# Patient Record
Sex: FEMALE | Race: WHITE | NOT HISPANIC OR LATINO | Employment: UNEMPLOYED | URBAN - NONMETROPOLITAN AREA
[De-identification: names, ages, dates, MRNs, and addresses within clinical notes are randomized per-mention and may not be internally consistent; named-entity substitution may affect disease eponyms.]

---
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
## 2019-01-02 ENCOUNTER — OFFICE VISIT (OUTPATIENT)
Dept: ORTHOPEDIC SURGERY | Facility: CLINIC | Age: 19
End: 2019-01-02

## 2019-01-02 VITALS — WEIGHT: 130 LBS | RESPIRATION RATE: 18 BRPM | HEIGHT: 60 IN | BODY MASS INDEX: 25.52 KG/M2

## 2019-01-02 DIAGNOSIS — M25.571 ARTHRALGIA OF RIGHT FOOT: Primary | ICD-10-CM

## 2019-01-02 DIAGNOSIS — T84.84XA PAINFUL ORTHOPAEDIC HARDWARE (HCC): ICD-10-CM

## 2019-01-02 DIAGNOSIS — Z98.890 STATUS POST HARDWARE REMOVAL: ICD-10-CM

## 2019-01-02 PROCEDURE — 99024 POSTOP FOLLOW-UP VISIT: CPT | Performed by: PHYSICIAN ASSISTANT

## 2019-01-02 NOTE — PROGRESS NOTES
Subjective   Patient ID: Beatriz Rai is a 18 y.o.  female is here today for a post-operative visit.  Post-op of the Right Ankle (Elective open removal of hardware from right ankle distal fibula 12/14/18)          CHIEF COMPLAINT:        History of Present Illness      Pain controlled: [] no   [x] yes   Medication refill requested: [x] no   [] yes    Patient compliant with instructions: [] no   [x] yes   Other: Reports good progress since surgery.  Patient states she has no pain to her ankle.  However, she states after walking for long periods of time she will have soreness to the top of her right foot.  This does subside with rest and ice.  She denies numbness or tingling   Patient thinks that due to the recent hardware removal of the ankle she may be overcompensating with the right foot.       Past Medical History:   Diagnosis Date   • Anxiety    • Bacterial vaginosis     REPORTS DIAGNOSED 12-11-18 AND THAT SHE WILL TAKE FLAGYL PER MD INSTRUCTIONS   • Body piercing     NOSE, EARS, BELLY BUTTON   • Closed right ankle fracture 2014    REPORTS INVOLVEMENT OF GROWTH PLATE   • Closed right arm fracture 2006   • Headache    • History of pneumonia    • Tachycardia     REPORTS SHE IS UNDER CARE OF DR GÓMEZ WITH MEDICATION    • Tattoos         Past Surgical History:   Procedure Laterality Date   • ANKLE OPEN REDUCTION INTERNAL FIXATION Right 09/25/2014    MD Jose Rafael   • APPENDECTOMY     • Elective open removal of hardware from right ankle distal fibula Right 12/14/2018    Performed by Stevo Clark MD at [facility] OR       No Known Allergies    Review of Systems   Constitutional: Negative for fever.   HENT: Negative for voice change.    Eyes: Negative for visual disturbance.   Respiratory: Negative for shortness of breath.    Cardiovascular: Negative for chest pain.   Gastrointestinal: Negative for abdominal distention and abdominal pain.   Genitourinary: Negative for dysuria.   Musculoskeletal: Positive for  "arthralgias. Negative for gait problem and joint swelling.   Skin: Negative for rash.   Neurological: Negative for speech difficulty.   Hematological: Does not bruise/bleed easily.   Psychiatric/Behavioral: Negative for confusion.       Objective   Resp 18   Ht 152.4 cm (60\")   Wt 59 kg (130 lb)   LMP 12/08/2018 (Within Days)   BMI 25.39 kg/m²       Signs of infection: [x] no                    [] yes   Drainage: [x] no                    [] yes   Incision: [x] healing well     []healed well   Motor exam intact: [] no                    [x] yes   Neurovascular exam intact: [] no                    [x] yes   Signs of compartment syndrome: [x] no                    [] yes   Signs of DVT: [x] no                    [] yes   Other:      Physical Exam   Constitutional: She is oriented to person, place, and time. She appears well-nourished.   Pulmonary/Chest: Effort normal.   Musculoskeletal:        Right ankle: She exhibits no swelling and no ecchymosis. No tenderness. Achilles tendon exhibits no pain, no defect and normal Rm's test results.        Right foot: There is tenderness (dorsum of right foot. ). There is no swelling, normal capillary refill, no crepitus and no deformity.   Neurological: She is alert and oriented to person, place, and time.   Skin: Capillary refill takes less than 2 seconds.   Psychiatric: She has a normal mood and affect.   Vitals reviewed.    Ortho Exam    Extremity DVT signs are Negative on physical exam with negative Bob sign, with no calf pain, with no palpable cords, with no increased pain with passive stretch/extension and with no skin tone change  Neurologic Exam     Mental Status   Oriented to person, place, and time.       Assessment/Plan   Independent Review of Radiographic Studies:    Shows no acute fracture or dislocation.  Laboratory and Other Studies:  No new results reviewed today.   Medical Decision Making:    Stable neurovascular exam.       Procedures     Beatriz " was seen today for post-op.    Diagnoses and all orders for this visit:    Arthralgia of right foot  -     XR Foot 3+ View Right    Painful orthopaedic hardware (CMS/HCC)    Status post hardware removal         Recommendations/Plan:     Sutures Staples or Pins [] Removed today  [] At prior visit  [] Plan removal later  None to remove   Physical therapy: []rehab facility  []outpatient referral  [] therapy ongoing   Ultrasound: [x]not ordered         []order given to patient   Labs: [x]not ordered         []order given to patient   Weight Bearing status: []Full [x]WBAT []PWB []NWB []Other     Discussion of orthopaedic goals and activities and patient and/or guardian expressed appreciation.  Risk, benefits, and merits of treatment alternatives reviewed with the patient and/or guardian and questions answered  Regular exercise as tolerated  Guided on proper techniques for mobility, strength, agility and/or conditioning exercises  Reduced physical activity as appropriate and avoid offending activity         Exercise, medications, injections, other patient advice, and return appointment as noted.    Patient is encouraged to call or return for any issues or concerns.  I did instruct patient on proper home exercises to strengthen the ankle and foot.  FU PRN    Patient agreeable to call or return sooner for any concerns.

## 2019-08-28 ENCOUNTER — OFFICE VISIT (OUTPATIENT)
Dept: FAMILY MEDICINE CLINIC | Facility: CLINIC | Age: 19
End: 2019-08-28

## 2019-08-28 VITALS
BODY MASS INDEX: 25.44 KG/M2 | WEIGHT: 130.25 LBS | OXYGEN SATURATION: 100 % | SYSTOLIC BLOOD PRESSURE: 108 MMHG | HEART RATE: 70 BPM | DIASTOLIC BLOOD PRESSURE: 72 MMHG

## 2019-08-28 DIAGNOSIS — M25.532 LEFT WRIST PAIN: Primary | ICD-10-CM

## 2019-08-28 DIAGNOSIS — M79.602 LEFT ARM PAIN: ICD-10-CM

## 2019-08-28 PROCEDURE — 99213 OFFICE O/P EST LOW 20 MIN: CPT | Performed by: FAMILY MEDICINE

## 2019-08-28 NOTE — PROGRESS NOTES
Subjective   Beatriz Rai is a 19 y.o. female.     History of Present Illness  Mrs. Rai presents today with complaint of left wrist and arm pain x2 weeks.  Pain began somewhat slowly in her left wrist, progressed to forearm, now affecting shoulder.  She denies any injury or change in activity.  Of note she works as a , often participates in resistance training and yoga.  She denies numbness in the extremity, no hillary weakness.  She is right-hand dominant.  She is treated with Advil which does help temporarily.  She feels it is mildly better today than yesterday.  Pain is aggravated with extension of wrist.    The following portions of the patient's history were reviewed and updated as appropriate: allergies, current medications, past family history, past medical history, past social history, past surgical history and problem list.    Review of Systems   Constitutional: Negative for fever.   HENT: Negative for congestion and sore throat.    Respiratory: Negative for cough.    Cardiovascular: Negative for chest pain, palpitations and leg swelling.   Gastrointestinal: Negative for diarrhea, nausea and vomiting.   Skin: Negative for color change, rash and wound.   Neurological: Negative for dizziness, tremors, numbness and headaches.   Hematological: Negative for adenopathy. Does not bruise/bleed easily.   Psychiatric/Behavioral: Negative for confusion.       Objective    Vitals:    08/28/19 1323   BP: 108/72   Pulse: 70   SpO2: 100%     Body mass index is 25.44 kg/m².      08/28/19  1323   Weight: 59.1 kg (130 lb 4 oz)       Physical Exam   Constitutional: She is oriented to person, place, and time. She appears well-developed and well-nourished. She is cooperative. She does not appear ill. No distress.   Musculoskeletal:        Left shoulder: Normal.        Left elbow: Normal.        Left wrist: She exhibits tenderness (soft tissue TTP dorsal wrist, proximal hand, mild TTP mid forearm over  extensor tendons). She exhibits normal range of motion, no bony tenderness, no swelling, no crepitus and no deformity.   BUEs neurovascularly intact   Neurological: She is alert and oriented to person, place, and time. She has normal strength and normal reflexes. She displays no tremor. No sensory deficit. Gait normal.   Skin: Skin is warm and dry. No bruising and no rash noted.   Psychiatric: She has a normal mood and affect. Her behavior is normal. Cognition and memory are normal.   Nursing note and vitals reviewed.      Assessment/Plan   Beatriz was seen today for arm pain and shoulder pain.    Diagnoses and all orders for this visit:    Left wrist pain    Left arm pain       Left wrist and forearm pain consistent with suspected overuse injury particularly affecting the extensor tendons.  Reviewed potential diagnostic/treatment options.  She voiced understanding and is amenable to conservative management including rest (avoid overuse or extension), ice/heat, compression, Aleve bid x 5-7 days.  If minimal improvement consider imaging, referral to physical therapy etc.    Patient was encouraged to keep me informed of any acute changes, lack of improvement, or any new concerning symptoms.  Pt is aware of reasons to seek emergent care.  Patient voiced understanding of all instructions and denied further questions.            Please note that portions of this note may have been completed with a voice recognition program. Efforts were made to edit the dictations, but occasionally words are mistranscribed.

## 2020-02-07 ENCOUNTER — LAB (OUTPATIENT)
Dept: LAB | Facility: HOSPITAL | Age: 20
End: 2020-02-07

## 2020-02-07 ENCOUNTER — TRANSCRIBE ORDERS (OUTPATIENT)
Dept: LAB | Facility: HOSPITAL | Age: 20
End: 2020-02-07

## 2020-02-07 DIAGNOSIS — Z3A.13 13 WEEKS GESTATION OF PREGNANCY: ICD-10-CM

## 2020-02-07 DIAGNOSIS — Z34.82 PRENATAL CARE, SUBSEQUENT PREGNANCY, SECOND TRIMESTER: Primary | ICD-10-CM

## 2020-02-07 DIAGNOSIS — Z34.82 PRENATAL CARE, SUBSEQUENT PREGNANCY, SECOND TRIMESTER: ICD-10-CM

## 2020-02-07 LAB
ABO GROUP BLD: NORMAL
AMPHET+METHAMPHET UR QL: NEGATIVE
AMPHETAMINES UR QL: NEGATIVE
BARBITURATES UR QL SCN: NEGATIVE
BASOPHILS # BLD AUTO: 0.02 10*3/MM3 (ref 0–0.2)
BASOPHILS NFR BLD AUTO: 0.2 % (ref 0–1.5)
BENZODIAZ UR QL SCN: NEGATIVE
BILIRUB UR QL STRIP: NEGATIVE
BLD GP AB SCN SERPL QL: NEGATIVE
BUPRENORPHINE SERPL-MCNC: NEGATIVE NG/ML
CANNABINOIDS SERPL QL: NEGATIVE
CLARITY UR: CLEAR
COCAINE UR QL: NEGATIVE
COLOR UR: YELLOW
DEPRECATED RDW RBC AUTO: 42.9 FL (ref 37–54)
EOSINOPHIL # BLD AUTO: 0.04 10*3/MM3 (ref 0–0.4)
EOSINOPHIL NFR BLD AUTO: 0.3 % (ref 0.3–6.2)
ERYTHROCYTE [DISTWIDTH] IN BLOOD BY AUTOMATED COUNT: 13.2 % (ref 12.3–15.4)
GLUCOSE BLD-MCNC: 64 MG/DL (ref 65–99)
GLUCOSE UR STRIP-MCNC: NEGATIVE MG/DL
HBV SURFACE AG SERPL QL IA: NORMAL
HCT VFR BLD AUTO: 41.4 % (ref 34–46.6)
HCV AB SER DONR QL: NORMAL
HGB BLD-MCNC: 14.5 G/DL (ref 12–15.9)
HGB UR QL STRIP.AUTO: NEGATIVE
HIV1+2 AB SER QL: NORMAL
IMM GRANULOCYTES # BLD AUTO: 0.09 10*3/MM3 (ref 0–0.05)
IMM GRANULOCYTES NFR BLD AUTO: 0.7 % (ref 0–0.5)
KETONES UR QL STRIP: NEGATIVE
LEUKOCYTE ESTERASE UR QL STRIP.AUTO: NEGATIVE
LYMPHOCYTES # BLD AUTO: 2.11 10*3/MM3 (ref 0.7–3.1)
LYMPHOCYTES NFR BLD AUTO: 17.1 % (ref 19.6–45.3)
MCH RBC QN AUTO: 31.3 PG (ref 26.6–33)
MCHC RBC AUTO-ENTMCNC: 35 G/DL (ref 31.5–35.7)
MCV RBC AUTO: 89.2 FL (ref 79–97)
METHADONE UR QL SCN: NEGATIVE
MONOCYTES # BLD AUTO: 0.59 10*3/MM3 (ref 0.1–0.9)
MONOCYTES NFR BLD AUTO: 4.8 % (ref 5–12)
NEUTROPHILS # BLD AUTO: 9.46 10*3/MM3 (ref 1.7–7)
NEUTROPHILS NFR BLD AUTO: 76.9 % (ref 42.7–76)
NITRITE UR QL STRIP: NEGATIVE
NRBC BLD AUTO-RTO: 0 /100 WBC (ref 0–0.2)
OPIATES UR QL: NEGATIVE
OXYCODONE UR QL SCN: NEGATIVE
PCP UR QL SCN: NEGATIVE
PH UR STRIP.AUTO: 6.5 [PH] (ref 5–8)
PLATELET # BLD AUTO: 281 10*3/MM3 (ref 140–450)
PMV BLD AUTO: 10.8 FL (ref 6–12)
PROPOXYPH UR QL: NEGATIVE
PROT UR QL STRIP: NEGATIVE
RBC # BLD AUTO: 4.64 10*6/MM3 (ref 3.77–5.28)
RH BLD: POSITIVE
SP GR UR STRIP: 1.01 (ref 1–1.03)
T4 FREE SERPL-MCNC: 1.13 NG/DL (ref 0.93–1.7)
TRICYCLICS UR QL SCN: NEGATIVE
TSH SERPL DL<=0.05 MIU/L-ACNC: 0.23 UIU/ML (ref 0.27–4.2)
UROBILINOGEN UR QL STRIP: NORMAL
WBC NRBC COR # BLD: 12.31 10*3/MM3 (ref 3.4–10.8)

## 2020-02-07 PROCEDURE — 84443 ASSAY THYROID STIM HORMONE: CPT

## 2020-02-07 PROCEDURE — 80306 DRUG TEST PRSMV INSTRMNT: CPT

## 2020-02-07 PROCEDURE — 86850 RBC ANTIBODY SCREEN: CPT

## 2020-02-07 PROCEDURE — 85025 COMPLETE CBC W/AUTO DIFF WBC: CPT

## 2020-02-07 PROCEDURE — 84439 ASSAY OF FREE THYROXINE: CPT

## 2020-02-07 PROCEDURE — 86901 BLOOD TYPING SEROLOGIC RH(D): CPT

## 2020-02-07 PROCEDURE — G0432 EIA HIV-1/HIV-2 SCREEN: HCPCS

## 2020-02-07 PROCEDURE — 86900 BLOOD TYPING SEROLOGIC ABO: CPT

## 2020-02-07 PROCEDURE — 87340 HEPATITIS B SURFACE AG IA: CPT

## 2020-02-07 PROCEDURE — 36415 COLL VENOUS BLD VENIPUNCTURE: CPT

## 2020-02-07 PROCEDURE — 81003 URINALYSIS AUTO W/O SCOPE: CPT | Performed by: NURSE PRACTITIONER

## 2020-02-07 PROCEDURE — 86803 HEPATITIS C AB TEST: CPT

## 2020-02-07 PROCEDURE — 80081 OBSTETRIC PANEL INC HIV TSTG: CPT

## 2020-02-07 PROCEDURE — 82947 ASSAY GLUCOSE BLOOD QUANT: CPT

## 2020-02-08 LAB
HOLD SPECIMEN: NORMAL
RPR SER QL: NORMAL
RUBV IGG SERPL IA-ACNC: POSITIVE

## 2020-03-26 ENCOUNTER — TRANSCRIBE ORDERS (OUTPATIENT)
Dept: LAB | Facility: HOSPITAL | Age: 20
End: 2020-03-26

## 2020-03-26 ENCOUNTER — LAB (OUTPATIENT)
Dept: LAB | Facility: HOSPITAL | Age: 20
End: 2020-03-26

## 2020-03-26 DIAGNOSIS — Z3A.16 16 WEEKS GESTATION OF PREGNANCY: ICD-10-CM

## 2020-03-26 DIAGNOSIS — Z3A.16 16 WEEKS GESTATION OF PREGNANCY: Primary | ICD-10-CM

## 2020-03-26 LAB — TSH SERPL DL<=0.05 MIU/L-ACNC: 2.17 UIU/ML (ref 0.27–4.2)

## 2020-03-26 PROCEDURE — 36415 COLL VENOUS BLD VENIPUNCTURE: CPT

## 2020-03-26 PROCEDURE — 84443 ASSAY THYROID STIM HORMONE: CPT

## 2020-05-15 ENCOUNTER — HOSPITAL ENCOUNTER (OUTPATIENT)
Facility: HOSPITAL | Age: 20
Setting detail: OBSERVATION
Discharge: HOME | End: 2020-05-15
Attending: OBSTETRICS & GYNECOLOGY | Admitting: OBSTETRICS & GYNECOLOGY

## 2020-05-15 VITALS
WEIGHT: 125 LBS | BODY MASS INDEX: 24.54 KG/M2 | SYSTOLIC BLOOD PRESSURE: 100 MMHG | TEMPERATURE: 99 F | DIASTOLIC BLOOD PRESSURE: 66 MMHG | RESPIRATION RATE: 18 BRPM | HEART RATE: 107 BPM | HEIGHT: 60 IN

## 2020-05-15 PROBLEM — O26.893 PELVIC PAIN AFFECTING PREGNANCY IN THIRD TRIMESTER, ANTEPARTUM: Status: ACTIVE | Noted: 2020-05-15

## 2020-05-15 PROBLEM — R10.2 PELVIC PAIN AFFECTING PREGNANCY IN THIRD TRIMESTER, ANTEPARTUM: Status: ACTIVE | Noted: 2020-05-15

## 2020-05-15 LAB
BILIRUB UR QL STRIP: NEGATIVE
CLARITY UR: CLEAR
COLOR UR: YELLOW
GLUCOSE UR STRIP-MCNC: NEGATIVE MG/DL
HGB UR QL STRIP.AUTO: NEGATIVE
KETONES UR QL STRIP: NEGATIVE
LEUKOCYTE ESTERASE UR QL STRIP.AUTO: NEGATIVE
NITRITE UR QL STRIP: NEGATIVE
PH UR STRIP.AUTO: 7 [PH] (ref 5–8)
PROT UR QL STRIP: NEGATIVE
SP GR UR STRIP: 1.01 (ref 1–1.03)
UROBILINOGEN UR QL STRIP: NORMAL

## 2020-05-15 PROCEDURE — G0378 HOSPITAL OBSERVATION PER HR: HCPCS

## 2020-05-15 PROCEDURE — G0463 HOSPITAL OUTPT CLINIC VISIT: HCPCS

## 2020-05-15 PROCEDURE — 99218 PR INITIAL OBSERVATION CARE/DAY 30 MINUTES: CPT | Performed by: OBSTETRICS & GYNECOLOGY

## 2020-05-15 PROCEDURE — 87086 URINE CULTURE/COLONY COUNT: CPT | Performed by: OBSTETRICS & GYNECOLOGY

## 2020-05-15 PROCEDURE — 81003 URINALYSIS AUTO W/O SCOPE: CPT | Performed by: OBSTETRICS & GYNECOLOGY

## 2020-05-15 PROCEDURE — 59025 FETAL NON-STRESS TEST: CPT

## 2020-05-15 RX ORDER — PRENATAL WITH FERROUS FUM AND FOLIC ACID 3080; 920; 120; 400; 22; 1.84; 3; 20; 10; 1; 12; 200; 27; 25; 2 [IU]/1; [IU]/1; MG/1; [IU]/1; MG/1; MG/1; MG/1; MG/1; MG/1; MG/1; UG/1; MG/1; MG/1; MG/1; MG/1
1 TABLET ORAL DAILY
COMMUNITY
End: 2021-04-05

## 2020-05-16 NOTE — H&P
"Beatriz Ackerman Fredi  2000  [id number]  [id number]    CC: lower abd pain  HPI:  Patient is 20 y.o. female   currently at 27w2d presents with c/o lower abd/pelvic pain, onset ~1800, intermittent, crampy, rated 5/10 earlier, radiated to back, denies assoc vag bleeding or SROM.  Good FM.  Denies recent intercourse.  Pain is now resolved.  BPNC to date     PMH:  Current meds: PNV, fish oil  Illnesses: SVT (resolved)  Surgeries: ankle surg X 2, lap appy  Allergies: NKDA    Past OB History:       OB History    Para Term  AB Living   1 0 0 0 0 0   SAB TAB Ectopic Molar Multiple Live Births   0 0 0 0 0 0      # Outcome Date GA Lbr Bautista/2nd Weight Sex Delivery Anes PTL Lv   1 Current                     SH: tob neg , EtOH neg, drugs neg  FH: heart dz neg , diabetes neg , cancer neg    General ROS: pelvic pain, HA (resolved), N (resolved).   All other systems reviewed and are negative.      Physical Examination: General appearance - alert, well appearing, and in no distress  Vital signs - /66   Pulse 107   Temp 99 °F (37.2 °C)   Resp 18   Ht 152.4 cm (60\")   Wt 56.7 kg (125 lb)   BMI 24.41 kg/m²   HEENT: normocephalic, atraumatic,oropharynx clear, appearance of ears and nose normal  Neck - supple, no significant adenopathy, no thyromegaly  Lymphatics - no palpable lymphadenopathy in the neck or groin, no hepatosplenomegaly  Chest - clear to auscultation, no wheezes, rales or rhonchi, respiratory effort non-labored  Heart - normal rate, regular rhythm, no murmurs, rubs, clicks or gallops, no JVD, no lower extremity edema  Abdomen - soft, nontender, nondistended, no masses, no hepatosplenomegaly  no rebound tenderness noted, bowel sounds normal  Vaginal Exam: cl/th/ballot, no blood in vault ,external genitalia normal  Extremities - no pedal edema noted, no calf tend  Skin -warm and dry, normal coloration and turgor, no rashes, no suspicious skin lesions noted      Fetal monitoring: " indication pelvic pain , onset  , offset 2316 , baseline 145-150, mod BTB variability , multiple accels (10 X 10), no decels, no detectable contractions, interpretation reactive NST    Radiology     Assessment 1)IUP 27 2/7 weeks   2)pelvic pain- poss  contractions.  Pain now resolved.  No evid MAKENZIE, UA neg    Plan 1)observe     2)home     3)keep next sched appt    Josue Chacon MD  5/15/2020  23:26

## 2020-05-17 LAB — BACTERIA SPEC AEROBE CULT: ABNORMAL

## 2020-05-21 ENCOUNTER — TRANSCRIBE ORDERS (OUTPATIENT)
Dept: LAB | Facility: HOSPITAL | Age: 20
End: 2020-05-21

## 2020-05-21 ENCOUNTER — LAB (OUTPATIENT)
Dept: LAB | Facility: HOSPITAL | Age: 20
End: 2020-05-21

## 2020-05-21 DIAGNOSIS — Z3A.28 28 WEEKS GESTATION OF PREGNANCY: ICD-10-CM

## 2020-05-21 DIAGNOSIS — Z34.83 PRENATAL CARE, SUBSEQUENT PREGNANCY, THIRD TRIMESTER: ICD-10-CM

## 2020-05-21 DIAGNOSIS — Z34.83 PRENATAL CARE, SUBSEQUENT PREGNANCY, THIRD TRIMESTER: Primary | ICD-10-CM

## 2020-05-21 LAB
DEPRECATED RDW RBC AUTO: 37 FL (ref 37–54)
ERYTHROCYTE [DISTWIDTH] IN BLOOD BY AUTOMATED COUNT: 11.5 % (ref 12.3–15.4)
GLUCOSE 1H P 100 G GLC PO SERPL-MCNC: 86 MG/DL (ref 65–140)
HCT VFR BLD AUTO: 35.8 % (ref 34–46.6)
HGB BLD-MCNC: 12.9 G/DL (ref 12–15.9)
MCH RBC QN AUTO: 32.3 PG (ref 26.6–33)
MCHC RBC AUTO-ENTMCNC: 36 G/DL (ref 31.5–35.7)
MCV RBC AUTO: 89.5 FL (ref 79–97)
PLATELET # BLD AUTO: 210 10*3/MM3 (ref 140–450)
PMV BLD AUTO: 10.8 FL (ref 6–12)
RBC # BLD AUTO: 4 10*6/MM3 (ref 3.77–5.28)
WBC NRBC COR # BLD: 14.46 10*3/MM3 (ref 3.4–10.8)

## 2020-05-21 PROCEDURE — 82950 GLUCOSE TEST: CPT

## 2020-05-21 PROCEDURE — 85027 COMPLETE CBC AUTOMATED: CPT

## 2020-05-21 PROCEDURE — 36415 COLL VENOUS BLD VENIPUNCTURE: CPT

## 2020-05-27 ENCOUNTER — TELEPHONE (OUTPATIENT)
Dept: FAMILY MEDICINE CLINIC | Facility: CLINIC | Age: 20
End: 2020-05-27

## 2020-05-27 NOTE — TELEPHONE ENCOUNTER
PT CALLED STATED THAT SHE WAS SUPPOSE TO HAVE LABS DONE AND STATED THAT A TUB WAS LOOSE.  PT WOULD LIKE A CALL BACK.    PLEASE ADVISE.  CALL BACK:[phone]

## 2020-05-27 NOTE — TELEPHONE ENCOUNTER
I called and spoke with the patient. She states that the lab was for her OBGYN.    I informed patient that she would need to contact her OBGYN's office to let them know about this and see what they would like for her to do.

## 2020-07-14 LAB — EXTERNAL GROUP B STREP ANTIGEN: POSITIVE

## 2020-08-09 ENCOUNTER — ANESTHESIA (OUTPATIENT)
Dept: LABOR AND DELIVERY | Facility: HOSPITAL | Age: 20
End: 2020-08-09

## 2020-08-09 ENCOUNTER — HOSPITAL ENCOUNTER (INPATIENT)
Facility: HOSPITAL | Age: 20
LOS: 3 days | Discharge: HOME | End: 2020-08-12
Attending: ADVANCED PRACTICE MIDWIFE | Admitting: OBSTETRICS & GYNECOLOGY

## 2020-08-09 ENCOUNTER — ANESTHESIA EVENT (OUTPATIENT)
Dept: LABOR AND DELIVERY | Facility: HOSPITAL | Age: 20
End: 2020-08-09

## 2020-08-09 PROBLEM — Z34.90 PREGNANCY: Status: ACTIVE | Noted: 2020-08-09

## 2020-08-09 LAB
ABO GROUP BLD: NORMAL
ALP SERPL-CCNC: 299 U/L (ref 39–117)
ALT SERPL W P-5'-P-CCNC: 16 U/L (ref 1–33)
AST SERPL-CCNC: 23 U/L (ref 1–32)
BILIRUB SERPL-MCNC: 0.3 MG/DL (ref 0–1.2)
BLD GP AB SCN SERPL QL: NEGATIVE
CREAT SERPL-MCNC: 0.6 MG/DL (ref 0.57–1)
DEPRECATED RDW RBC AUTO: 39.3 FL (ref 37–54)
ERYTHROCYTE [DISTWIDTH] IN BLOOD BY AUTOMATED COUNT: 12.9 % (ref 12.3–15.4)
HCT VFR BLD AUTO: 34.7 % (ref 34–46.6)
HGB BLD-MCNC: 11.6 G/DL (ref 12–15.9)
LDH SERPL-CCNC: 231 U/L (ref 135–214)
MCH RBC QN AUTO: 28.2 PG (ref 26.6–33)
MCHC RBC AUTO-ENTMCNC: 33.4 G/DL (ref 31.5–35.7)
MCV RBC AUTO: 84.2 FL (ref 79–97)
PLATELET # BLD AUTO: 189 10*3/MM3 (ref 140–450)
PMV BLD AUTO: 11.1 FL (ref 6–12)
RBC # BLD AUTO: 4.12 10*6/MM3 (ref 3.77–5.28)
RH BLD: POSITIVE
T&S EXPIRATION DATE: NORMAL
URATE SERPL-MCNC: 5.9 MG/DL (ref 2.4–5.7)
WBC # BLD AUTO: 15.46 10*3/MM3 (ref 3.4–10.8)

## 2020-08-09 PROCEDURE — 82565 ASSAY OF CREATININE: CPT | Performed by: OBSTETRICS & GYNECOLOGY

## 2020-08-09 PROCEDURE — 51703 INSERT BLADDER CATH COMPLEX: CPT

## 2020-08-09 PROCEDURE — 84450 TRANSFERASE (AST) (SGOT): CPT | Performed by: OBSTETRICS & GYNECOLOGY

## 2020-08-09 PROCEDURE — 82247 BILIRUBIN TOTAL: CPT | Performed by: OBSTETRICS & GYNECOLOGY

## 2020-08-09 PROCEDURE — 83615 LACTATE (LD) (LDH) ENZYME: CPT | Performed by: OBSTETRICS & GYNECOLOGY

## 2020-08-09 PROCEDURE — 86850 RBC ANTIBODY SCREEN: CPT | Performed by: OBSTETRICS & GYNECOLOGY

## 2020-08-09 PROCEDURE — 84075 ASSAY ALKALINE PHOSPHATASE: CPT | Performed by: OBSTETRICS & GYNECOLOGY

## 2020-08-09 PROCEDURE — 25010000002 PENICILLIN G POTASSIUM PER 600000 UNITS: Performed by: OBSTETRICS & GYNECOLOGY

## 2020-08-09 PROCEDURE — 84550 ASSAY OF BLOOD/URIC ACID: CPT | Performed by: OBSTETRICS & GYNECOLOGY

## 2020-08-09 PROCEDURE — 85027 COMPLETE CBC AUTOMATED: CPT | Performed by: OBSTETRICS & GYNECOLOGY

## 2020-08-09 PROCEDURE — 84460 ALANINE AMINO (ALT) (SGPT): CPT | Performed by: OBSTETRICS & GYNECOLOGY

## 2020-08-09 PROCEDURE — 86901 BLOOD TYPING SEROLOGIC RH(D): CPT | Performed by: OBSTETRICS & GYNECOLOGY

## 2020-08-09 PROCEDURE — C1755 CATHETER, INTRASPINAL: HCPCS

## 2020-08-09 PROCEDURE — 59025 FETAL NON-STRESS TEST: CPT

## 2020-08-09 PROCEDURE — 86900 BLOOD TYPING SEROLOGIC ABO: CPT | Performed by: OBSTETRICS & GYNECOLOGY

## 2020-08-09 PROCEDURE — 99221 1ST HOSP IP/OBS SF/LOW 40: CPT | Performed by: OBSTETRICS & GYNECOLOGY

## 2020-08-09 RX ORDER — MAGNESIUM CARB/ALUMINUM HYDROX 105-160MG
30 TABLET,CHEWABLE ORAL ONCE
Status: DISCONTINUED | OUTPATIENT
Start: 2020-08-09 | End: 2020-08-10 | Stop reason: HOSPADM

## 2020-08-09 RX ORDER — TRISODIUM CITRATE DIHYDRATE AND CITRIC ACID MONOHYDRATE 500; 334 MG/5ML; MG/5ML
30 SOLUTION ORAL ONCE
Status: DISCONTINUED | OUTPATIENT
Start: 2020-08-10 | End: 2020-08-10 | Stop reason: HOSPADM

## 2020-08-09 RX ORDER — SODIUM CHLORIDE 0.9 % (FLUSH) 0.9 %
10 SYRINGE (ML) INJECTION AS NEEDED
Status: DISCONTINUED | OUTPATIENT
Start: 2020-08-09 | End: 2020-08-10 | Stop reason: HOSPADM

## 2020-08-09 RX ORDER — EPHEDRINE SULFATE/0.9% NACL/PF 25 MG/5 ML
10 SYRINGE (ML) INTRAVENOUS
Status: DISCONTINUED | OUTPATIENT
Start: 2020-08-09 | End: 2020-08-10 | Stop reason: HOSPADM

## 2020-08-09 RX ORDER — SERTRALINE HYDROCHLORIDE 25 MG/1
25 TABLET, FILM COATED ORAL DAILY
COMMUNITY
End: 2021-04-05

## 2020-08-09 RX ORDER — ONDANSETRON 2 MG/ML
4 INJECTION INTRAMUSCULAR; INTRAVENOUS ONCE AS NEEDED
Status: COMPLETED | OUTPATIENT
Start: 2020-08-09 | End: 2020-08-10

## 2020-08-09 RX ORDER — FAMOTIDINE 10 MG/ML
20 INJECTION, SOLUTION INTRAVENOUS ONCE AS NEEDED
Status: DISCONTINUED | OUTPATIENT
Start: 2020-08-09 | End: 2020-08-10 | Stop reason: HOSPADM

## 2020-08-09 RX ORDER — PENICILLIN G 3000000 [IU]/50ML
3 INJECTION, SOLUTION INTRAVENOUS EVERY 4 HOURS
Status: DISCONTINUED | OUTPATIENT
Start: 2020-08-09 | End: 2020-08-10 | Stop reason: HOSPADM

## 2020-08-09 RX ORDER — SODIUM CHLORIDE 0.9 % (FLUSH) 0.9 %
3 SYRINGE (ML) INJECTION EVERY 12 HOURS SCHEDULED
Status: DISCONTINUED | OUTPATIENT
Start: 2020-08-09 | End: 2020-08-10 | Stop reason: HOSPADM

## 2020-08-09 RX ORDER — CHLORAL HYDRATE 500 MG
CAPSULE ORAL
COMMUNITY
End: 2021-04-05

## 2020-08-09 RX ORDER — ROPIVACAINE HYDROCHLORIDE 2 MG/ML
15 INJECTION, SOLUTION EPIDURAL; INFILTRATION; PERINEURAL CONTINUOUS
Status: DISCONTINUED | OUTPATIENT
Start: 2020-08-10 | End: 2020-08-12 | Stop reason: HOSPADM

## 2020-08-09 RX ORDER — METOCLOPRAMIDE HYDROCHLORIDE 5 MG/ML
10 INJECTION INTRAMUSCULAR; INTRAVENOUS ONCE AS NEEDED
Status: DISCONTINUED | OUTPATIENT
Start: 2020-08-09 | End: 2020-08-10 | Stop reason: HOSPADM

## 2020-08-09 RX ORDER — SODIUM CHLORIDE, SODIUM LACTATE, POTASSIUM CHLORIDE, CALCIUM CHLORIDE 600; 310; 30; 20 MG/100ML; MG/100ML; MG/100ML; MG/100ML
125 INJECTION, SOLUTION INTRAVENOUS CONTINUOUS
Status: DISCONTINUED | OUTPATIENT
Start: 2020-08-09 | End: 2020-08-12 | Stop reason: HOSPADM

## 2020-08-09 RX ORDER — DIPHENHYDRAMINE HYDROCHLORIDE 50 MG/ML
12.5 INJECTION INTRAMUSCULAR; INTRAVENOUS EVERY 8 HOURS PRN
Status: DISCONTINUED | OUTPATIENT
Start: 2020-08-09 | End: 2020-08-10 | Stop reason: HOSPADM

## 2020-08-09 RX ORDER — LIDOCAINE HYDROCHLORIDE 10 MG/ML
5 INJECTION, SOLUTION EPIDURAL; INFILTRATION; INTRACAUDAL; PERINEURAL AS NEEDED
Status: DISCONTINUED | OUTPATIENT
Start: 2020-08-09 | End: 2020-08-10 | Stop reason: HOSPADM

## 2020-08-09 RX ADMIN — PENICILLIN G POTASSIUM 5 MILLION UNITS: 5000000 POWDER, FOR SOLUTION INTRAMUSCULAR; INTRAPLEURAL; INTRATHECAL; INTRAVENOUS at 15:32

## 2020-08-09 RX ADMIN — SODIUM CHLORIDE, POTASSIUM CHLORIDE, SODIUM LACTATE AND CALCIUM CHLORIDE 125 ML/HR: 600; 310; 30; 20 INJECTION, SOLUTION INTRAVENOUS at 20:30

## 2020-08-09 RX ADMIN — SODIUM CHLORIDE, POTASSIUM CHLORIDE, SODIUM LACTATE AND CALCIUM CHLORIDE 125 ML/HR: 600; 310; 30; 20 INJECTION, SOLUTION INTRAVENOUS at 15:10

## 2020-08-09 RX ADMIN — PENICILLIN G 3 MILLION UNITS: 3000000 INJECTION, SOLUTION INTRAVENOUS at 20:32

## 2020-08-09 NOTE — H&P
VANDA Levy  Obstetric History and Physical    Referring Provider: No Known Provider      Chief Complaint   Patient presents with   • Contractions       Subjective     Patient is a 20 y.o. female  currently at 39w4d, who presents with C/O contractions.  She reports contractions every 5 to 8 minutes since 4 this morning without associated leaking of fluid.  She does admit to having  bloody show.  She reports normal fetal activity.  Prenatal care by BIRGIT White CNM.        The following portions of the patients history were reviewed and updated as appropriate: current medications, allergies, past medical history, past surgical history, past family history, past social history and problem list .       Prenatal Information:   Maternal Prenatal Labs  Blood Type No results found for: ABO   Rh Status No results found for: RH   Antibody Screen No results found for: ABSCRN   Gonnorhea No results found for: GCCX   Chlamydia No results found for: CLAMYDCU   RPR No results found for: RPR   Syphilis Antibody No results found for: SYPHILIS   Rubella No results found for: RUBELLAIGGIN   Hepatitis B Surface Antigen No results found for: HEPBSAG   HIV-1 Antibody No results found for: LABHIV1   Hepatitis C Antibody No results found for: HEPCAB   Rapid Urin Drug Screen No results found for: AMPMETHU, BARBITSCNUR, LABBENZSCN, LABMETHSCN, LABOPIASCN, THCURSCR, COCAINEUR, AMPHETSCREEN, PROPOXSCN, BUPRENORSCNU, METAMPSCNUR, OXYCODONESCN, TRICYCLICSCN   Group B Strep Culture No results found for: GBSANTIGEN           External Prenatal Results     Pregnancy Outside Results - Transcribed From Office Records - See Scanned Records For Details     Test Value Date Time    Hgb 12.9 g/dL 20 1011      14.5 g/dL 20 1340    Hct 35.8 % 20 1011      41.4 % 20 1340    ABO AB  20 1340    Rh Positive  20 1340    Antibody Screen Negative  20 1340    Glucose Fasting GTT       Glucose Tolerance Test 1 hour        Glucose Tolerance Test 3 hour       Gonorrhea (discrete) Negative  18 1300    Chlamydia (discrete) Negative  18 1300    RPR Non-Reactive  20 1340    VDRL       Syphilis Antibody       Rubella Positive  20 1340    HBsAg Non-Reactive  20 1340    Herpes Simplex Virus PCR       Herpes Simplex VIrus Culture       HIV Non-Reactive  20 1340    Hep C RNA Quant PCR       Hep C Antibody Non-Reactive  20 1340    AFP       Group B Strep       GBS Susceptibility to Clindamycin       GBS Susceptibility to Erythromycin       Fetal Fibronectin       Genetic Testing, Maternal Blood             Drug Screening     Test Value Date Time    Urine Drug Screen       Amphetamine Screen Negative  20 1340    Barbiturate Screen Negative  20 1340    Benzodiazepine Screen Negative  20 1340    Methadone Screen Negative  20 1340    Phencyclidine Screen Negative  20 1340    Opiates Screen Negative  20 1340    THC Screen Negative  20 1340    Cocaine Screen       Propoxyphene Screen Negative  20 1340    Buprenorphine Screen Negative  20 1340    Methamphetamine Screen       Oxycodone Screen Negative  20 1340    Tricyclic Antidepressants Screen Negative  20 1340                  Past OB History:       OB History    Para Term  AB Living   1 0 0 0 0 0   SAB TAB Ectopic Molar Multiple Live Births   0 0 0 0 0 0      # Outcome Date GA Lbr Bautista/2nd Weight Sex Delivery Anes PTL Lv   1 Current                Past Medical History: Past Medical History:   Diagnosis Date   • Anxiety    • Bacterial vaginosis     REPORTS DIAGNOSED 17 AND THAT SHE WILL TAKE FLAGYL PER MD INSTRUCTIONS   • Body piercing     NOSE, EARS, BELLY BUTTON   • Closed right ankle fracture 2014    REPORTS INVOLVEMENT OF GROWTH PLATE   • Closed right arm fracture 2006   • Headache    • History of pneumonia    • Tachycardia     REPORTS SHE IS UNDER CARE OF DR GÓMEZ  WITH MEDICATION    • Tattoos       Past Surgical History Past Surgical History:   Procedure Laterality Date   • ANKLE OPEN REDUCTION INTERNAL FIXATION Right 09/25/2014    MD Jose Rafael   • APPENDECTOMY     • HARDWARE REMOVAL Right 12/14/2018    Procedure: Elective open removal of hardware from right ankle distal fibula;  Surgeon: Stevo Clark MD;  Location: [facility];  Service: Orthopedics      Family History: Family History   Problem Relation Age of Onset   • Coronary artery disease Father    • Diabetes Father    • Hypertension Father    • No Known Problems Mother    • Throat cancer Paternal Grandfather    • Heart attack Paternal Grandmother    • No Known Problems Maternal Grandmother    • No Known Problems Maternal Grandfather    • Osteoarthritis Other    • Rheumatologic disease Other       Social History:  reports that she has never smoked. She has never used smokeless tobacco.   reports that she drank alcohol.   reports that she does not use drugs.                   General ROS Negative Findings:Headaches, Visual Changes, Epigastric pain, Anorexia, Nausia/Vomiting, ROM and Vaginal Bleeding    ROS     All other systems have been reviewed and are neg  Objective       Vital Signs Range for the last 24 hours  Temperature: Temp:  [98.5 °F (36.9 °C)] 98.5 °F (36.9 °C)   Temp Source: Temp src: Oral   BP: BP: (116)/(75) 116/75   Pulse: Heart Rate:  [101] 101   Respirations: Resp:  [16] 16   SPO2:     O2 Amount (l/min):     O2 Devices     Weight: Weight:  [62.6 kg (138 lb)] 62.6 kg (138 lb)     Physical Examination:   General:   alert, appears stated age and cooperative   Skin:   normal   HEENT:  Sclera clear   Lungs:   clear to auscultation bilaterally   Heart:   regular rate and rhythm, S1, S2 normal, no murmur, click, rub or gallop   Abdomen:  Soft, gravid uterus, guarding, rebound benign exam   Lower Extremities  no edema, no calf transfer range of motion   Pelvis:  External genitalia: normal general  appearance  Uterus: enlarged         Presentation: vtx   Cervix: Exam by: Method: sterile exam per physician(Dr. Daivla)   Dilation:  1   Effacement: Cervical Effacement: 70-80%   Station:  -1    Bloody show noted.       Fetal Heart Rate Assessment   Method: Fetal HR Assessment Method: external   Beats/min: Fetal HR (beats/min): 145   Baseline: Fetal Heart Baseline Rate: normal range   Varibility: Fetal HR Variability: minimal (detectable, amplitude less than or equal to 5 bpm)   Accels: Fetal HR Accelerations: absent   Decels: Fetal HR Decelerations: absent   Tracing Category:       Uterine Assessment   Method: Method: external tocotransducer   Frequency (min): Contraction Frequency (Minutes): 3-5   Ctx Count in 10 min:     Duration:     Intensity: Contraction Intensity: mild by palpation   Intensity by IUPC:     Resting Tone: Uterine Resting Tone: soft by palpation   Resting Tone by IUPC:     [place] Units:       Laboratory Results:   Lab Results (last 24 hours)     ** No results found for the last 24 hours. **        Radiology Review:   Imaging Results (Last 24 Hours)     ** No results found for the last 24 hours. **        Other Studies:    Assessment/Plan       Pregnancy        Assessment:  1.  Intrauterine pregnancy at 39w4d weeks gestation with reactive, late decelerations present fetal status.    2.  GBS positive  3.  Early labor  4.      Plan:  1. Admit, GBS prophylaxis, IV, labs, expectant management for now    2. Plan of care has been reviewed with patient.  3.  Risks, benefits of treatment plan have been discussed.  4.  All questions have been answered.  5   discussed with MEERA Sharpe DO  8/9/2020  14:46

## 2020-08-09 NOTE — PLAN OF CARE
Problem: Patient Care Overview  Goal: Plan of Care Review  Outcome: Ongoing (interventions implemented as appropriate)     Problem: Prenatal Patient, Hospitalized (Adult,Obstetrics,Pediatric)  Goal: Signs and Symptoms of Listed Potential Problems Will be Absent, Minimized or Managed (Prenatal Patient, Hospitalized)  Outcome: Ongoing (interventions implemented as appropriate)

## 2020-08-10 ENCOUNTER — APPOINTMENT (OUTPATIENT)
Dept: PREADMISSION TESTING | Facility: HOSPITAL | Age: 20
End: 2020-08-10

## 2020-08-10 PROBLEM — R51.9 FREQUENT HEADACHES: Status: RESOLVED | Noted: 2018-09-14 | Resolved: 2020-08-10

## 2020-08-10 PROBLEM — R10.2 PELVIC PAIN AFFECTING PREGNANCY IN THIRD TRIMESTER, ANTEPARTUM: Status: RESOLVED | Noted: 2020-05-15 | Resolved: 2020-08-10

## 2020-08-10 PROBLEM — T84.84XA PAINFUL ORTHOPAEDIC HARDWARE (HCC): Status: RESOLVED | Noted: 2018-12-10 | Resolved: 2020-08-10

## 2020-08-10 PROBLEM — H93.13 TINNITUS OF BOTH EARS: Status: RESOLVED | Noted: 2018-09-14 | Resolved: 2020-08-10

## 2020-08-10 PROBLEM — O26.893 PELVIC PAIN AFFECTING PREGNANCY IN THIRD TRIMESTER, ANTEPARTUM: Status: RESOLVED | Noted: 2020-05-15 | Resolved: 2020-08-10

## 2020-08-10 PROBLEM — Z34.90 PREGNANCY: Status: RESOLVED | Noted: 2020-08-09 | Resolved: 2020-08-10

## 2020-08-10 PROBLEM — M25.571 ARTHRALGIA OF RIGHT ANKLE: Status: RESOLVED | Noted: 2018-12-10 | Resolved: 2020-08-10

## 2020-08-10 LAB
ATMOSPHERIC PRESS: ABNORMAL MM[HG]
ATMOSPHERIC PRESS: ABNORMAL MM[HG]
BASE EXCESS BLDCOA CALC-SCNC: -3 MMOL/L (ref 0–2)
BASE EXCESS BLDCOV CALC-SCNC: -2.9 MMOL/L (ref 0–2)
BDY SITE: ABNORMAL
BDY SITE: ABNORMAL
BODY TEMPERATURE: 37 C
BODY TEMPERATURE: 37 C
CO2 BLDA-SCNC: 24.6 MMOL/L (ref 22–33)
CO2 BLDA-SCNC: 24.7 MMOL/L (ref 22–33)
COLLECT TME SMN: ABNORMAL
HCO3 BLDCOA-SCNC: 23.2 MMOL/L (ref 16.9–20.5)
HCO3 BLDCOV-SCNC: 23.3 MMOL/L (ref 18.6–21.4)
HGB BLDA-MCNC: 14.8 G/DL (ref 14–18)
HGB BLDA-MCNC: 14.9 G/DL (ref 14–18)
INHALED O2 CONCENTRATION: 21 %
INHALED O2 CONCENTRATION: 21 %
MODALITY: ABNORMAL
MODALITY: ABNORMAL
NOTE: ABNORMAL
NOTE: ABNORMAL
PCO2 BLDCOA: 44.8 MMHG (ref 43.3–54.9)
PCO2 BLDCOV: 44.9 MM HG
PH BLDCOA: 7.32 PH UNITS (ref 7.22–7.3)
PH BLDCOV: 7.32 PH UNITS
PO2 BLDCOA: 15.7 MMHG (ref 11.5–43.3)
PO2 BLDCOV: 14.9 MM HG
SAO2 % BLDCOA: 28.8 %
SAO2 % BLDCOA: ABNORMAL %
SAO2 % BLDCOV: 27.3 %

## 2020-08-10 PROCEDURE — 82805 BLOOD GASES W/O2 SATURATION: CPT

## 2020-08-10 PROCEDURE — 0UQMXZZ REPAIR VULVA, EXTERNAL APPROACH: ICD-10-PCS | Performed by: OBSTETRICS & GYNECOLOGY

## 2020-08-10 PROCEDURE — 25010000002 ONDANSETRON PER 1 MG: Performed by: ANESTHESIOLOGY

## 2020-08-10 PROCEDURE — 25010000002 FENTANYL CITRATE (PF) 100 MCG/2ML SOLUTION: Performed by: ANESTHESIOLOGY

## 2020-08-10 PROCEDURE — 25010000002 ROPIVACAINE PER 1 MG: Performed by: ANESTHESIOLOGY

## 2020-08-10 PROCEDURE — C1755 CATHETER, INTRASPINAL: HCPCS | Performed by: ANESTHESIOLOGY

## 2020-08-10 PROCEDURE — 25010000002 PENICILLIN G POTASSIUM PER 600000 UNITS: Performed by: OBSTETRICS & GYNECOLOGY

## 2020-08-10 RX ORDER — MISOPROSTOL 200 UG/1
800 TABLET ORAL AS NEEDED
Status: DISCONTINUED | OUTPATIENT
Start: 2020-08-10 | End: 2020-08-10 | Stop reason: HOSPADM

## 2020-08-10 RX ORDER — LIDOCAINE HYDROCHLORIDE 10 MG/ML
INJECTION, SOLUTION EPIDURAL; INFILTRATION; INTRACAUDAL; PERINEURAL AS NEEDED
Status: DISCONTINUED | OUTPATIENT
Start: 2020-08-10 | End: 2020-08-10 | Stop reason: SURG

## 2020-08-10 RX ORDER — ONDANSETRON 4 MG/1
4 TABLET, FILM COATED ORAL EVERY 8 HOURS PRN
Status: DISCONTINUED | OUTPATIENT
Start: 2020-08-10 | End: 2020-08-12 | Stop reason: HOSPADM

## 2020-08-10 RX ORDER — METHYLERGONOVINE MALEATE 0.2 MG/ML
200 INJECTION INTRAVENOUS ONCE AS NEEDED
Status: DISCONTINUED | OUTPATIENT
Start: 2020-08-10 | End: 2020-08-10 | Stop reason: HOSPADM

## 2020-08-10 RX ORDER — IBUPROFEN 600 MG/1
600 TABLET ORAL EVERY 6 HOURS PRN
Status: DISCONTINUED | OUTPATIENT
Start: 2020-08-10 | End: 2020-08-12 | Stop reason: HOSPADM

## 2020-08-10 RX ORDER — OXYTOCIN-SODIUM CHLORIDE 0.9% IV SOLN 30 UNIT/500ML 30-0.9/5 UT/ML-%
650 SOLUTION INTRAVENOUS ONCE
Status: COMPLETED | OUTPATIENT
Start: 2020-08-10 | End: 2020-08-10

## 2020-08-10 RX ORDER — BISACODYL 10 MG
10 SUPPOSITORY, RECTAL RECTAL DAILY PRN
Status: DISCONTINUED | OUTPATIENT
Start: 2020-08-11 | End: 2020-08-12 | Stop reason: HOSPADM

## 2020-08-10 RX ORDER — HYDROCORTISONE 25 MG/G
1 CREAM TOPICAL AS NEEDED
Status: DISCONTINUED | OUTPATIENT
Start: 2020-08-10 | End: 2020-08-12 | Stop reason: HOSPADM

## 2020-08-10 RX ORDER — OXYTOCIN-SODIUM CHLORIDE 0.9% IV SOLN 30 UNIT/500ML 30-0.9/5 UT/ML-%
85 SOLUTION INTRAVENOUS ONCE
Status: DISCONTINUED | OUTPATIENT
Start: 2020-08-10 | End: 2020-08-10 | Stop reason: HOSPADM

## 2020-08-10 RX ORDER — LIDOCAINE HYDROCHLORIDE AND EPINEPHRINE 15; 5 MG/ML; UG/ML
INJECTION, SOLUTION EPIDURAL AS NEEDED
Status: DISCONTINUED | OUTPATIENT
Start: 2020-08-10 | End: 2020-08-10 | Stop reason: SURG

## 2020-08-10 RX ORDER — FENTANYL CITRATE 50 UG/ML
INJECTION, SOLUTION INTRAMUSCULAR; INTRAVENOUS AS NEEDED
Status: DISCONTINUED | OUTPATIENT
Start: 2020-08-10 | End: 2020-08-10 | Stop reason: SURG

## 2020-08-10 RX ORDER — BUPIVACAINE HYDROCHLORIDE 2.5 MG/ML
INJECTION, SOLUTION EPIDURAL; INFILTRATION; INTRACAUDAL AS NEEDED
Status: DISCONTINUED | OUTPATIENT
Start: 2020-08-10 | End: 2020-08-10 | Stop reason: SURG

## 2020-08-10 RX ORDER — CARBOPROST TROMETHAMINE 250 UG/ML
250 INJECTION, SOLUTION INTRAMUSCULAR AS NEEDED
Status: DISCONTINUED | OUTPATIENT
Start: 2020-08-10 | End: 2020-08-10 | Stop reason: HOSPADM

## 2020-08-10 RX ORDER — SODIUM CHLORIDE 0.9 % (FLUSH) 0.9 %
1-10 SYRINGE (ML) INJECTION AS NEEDED
Status: DISCONTINUED | OUTPATIENT
Start: 2020-08-10 | End: 2020-08-12 | Stop reason: HOSPADM

## 2020-08-10 RX ORDER — LANOLIN
CREAM (ML) TOPICAL
Status: DISCONTINUED | OUTPATIENT
Start: 2020-08-10 | End: 2020-08-12 | Stop reason: HOSPADM

## 2020-08-10 RX ORDER — DOCUSATE SODIUM 100 MG/1
100 CAPSULE, LIQUID FILLED ORAL 2 TIMES DAILY
Status: DISCONTINUED | OUTPATIENT
Start: 2020-08-10 | End: 2020-08-12 | Stop reason: HOSPADM

## 2020-08-10 RX ORDER — ACETAMINOPHEN 325 MG/1
650 TABLET ORAL EVERY 4 HOURS PRN
Status: DISCONTINUED | OUTPATIENT
Start: 2020-08-10 | End: 2020-08-12 | Stop reason: HOSPADM

## 2020-08-10 RX ADMIN — PENICILLIN G 3 MILLION UNITS: 3000000 INJECTION, SOLUTION INTRAVENOUS at 00:55

## 2020-08-10 RX ADMIN — ROPIVACAINE HYDROCHLORIDE 15 ML/HR: 2 INJECTION, SOLUTION EPIDURAL; INFILTRATION at 00:28

## 2020-08-10 RX ADMIN — ROPIVACAINE HYDROCHLORIDE: 2 INJECTION, SOLUTION EPIDURAL; INFILTRATION at 07:26

## 2020-08-10 RX ADMIN — BUPIVACAINE HYDROCHLORIDE 12 ML: 2.5 INJECTION, SOLUTION EPIDURAL; INFILTRATION; INTRACAUDAL; PERINEURAL at 00:23

## 2020-08-10 RX ADMIN — SODIUM CHLORIDE, POTASSIUM CHLORIDE, SODIUM LACTATE AND CALCIUM CHLORIDE 1000 ML: 600; 310; 30; 20 INJECTION, SOLUTION INTRAVENOUS at 00:20

## 2020-08-10 RX ADMIN — PENICILLIN G 3 MILLION UNITS: 3000000 INJECTION, SOLUTION INTRAVENOUS at 04:55

## 2020-08-10 RX ADMIN — WITCH HAZEL 1 PAD: 500 SOLUTION RECTAL; TOPICAL at 14:46

## 2020-08-10 RX ADMIN — SODIUM CHLORIDE, POTASSIUM CHLORIDE, SODIUM LACTATE AND CALCIUM CHLORIDE 125 ML/HR: 600; 310; 30; 20 INJECTION, SOLUTION INTRAVENOUS at 00:55

## 2020-08-10 RX ADMIN — SODIUM CHLORIDE, POTASSIUM CHLORIDE, SODIUM LACTATE AND CALCIUM CHLORIDE 125 ML/HR: 600; 310; 30; 20 INJECTION, SOLUTION INTRAVENOUS at 10:21

## 2020-08-10 RX ADMIN — OXYTOCIN 650 ML/HR: 10 INJECTION INTRAVENOUS at 12:42

## 2020-08-10 RX ADMIN — IBUPROFEN 600 MG: 600 TABLET, FILM COATED ORAL at 22:04

## 2020-08-10 RX ADMIN — DOCUSATE SODIUM 100 MG: 100 CAPSULE, LIQUID FILLED ORAL at 22:04

## 2020-08-10 RX ADMIN — Medication: at 14:47

## 2020-08-10 RX ADMIN — ONDANSETRON 4 MG: 2 INJECTION INTRAMUSCULAR; INTRAVENOUS at 08:14

## 2020-08-10 RX ADMIN — LIDOCAINE HYDROCHLORIDE AND EPINEPHRINE 3 ML: 15; 5 INJECTION, SOLUTION EPIDURAL at 00:19

## 2020-08-10 RX ADMIN — Medication 10 MG: at 01:38

## 2020-08-10 RX ADMIN — IBUPROFEN 600 MG: 600 TABLET, FILM COATED ORAL at 14:45

## 2020-08-10 RX ADMIN — LIDOCAINE HYDROCHLORIDE 5 ML: 10 INJECTION, SOLUTION EPIDURAL; INFILTRATION; INTRACAUDAL; PERINEURAL at 00:17

## 2020-08-10 RX ADMIN — BENZOCAINE 1 APPLICATION: 5.6 OINTMENT TOPICAL at 14:46

## 2020-08-10 RX ADMIN — FENTANYL CITRATE 100 MCG: 50 INJECTION, SOLUTION INTRAMUSCULAR; INTRAVENOUS at 00:21

## 2020-08-10 RX ADMIN — PENICILLIN G 3 MILLION UNITS: 3000000 INJECTION, SOLUTION INTRAVENOUS at 08:30

## 2020-08-10 RX ADMIN — Medication: at 14:45

## 2020-08-10 RX ADMIN — LIDOCAINE HYDROCHLORIDE AND EPINEPHRINE 2 ML: 15; 5 INJECTION, SOLUTION EPIDURAL at 00:20

## 2020-08-10 NOTE — L&D DELIVERY NOTE
[facility]  Vaginal Delivery Note    Delivery     Delivery: Vaginal, Spontaneous     YOB: 2021    Time of Birth:  Gestational Age 12:35 PM   39w5d     Anesthesia: Epidural     Delivering clinician: Sadie Cm    Forceps?   No   Vacuum? No    Shoulder dystocia present: No        Delivery narrative:  Beatriz pushed effectively to deliver a live born male infant over an intact perineum with a labor epidural. Upon delivery of infant's head a loose nuchal cord was encountered and easily reduced. Infant's anterior shoulder and body delivered atraumatically over next push. Infant non vigorous at delivery, terminal meconium noted. Cord double clamped and cut and infant handed to awaiting nurses for further resuscitation. Placenta delivered spontaneously and was visually intact. Fundus firm. Right labial laceration repaired with 4-0 vicryl rapide. EBL 100ml    Infant    Findings: male  infant     Infant observations: Weight: 3255 g (7 lb 2.8 oz)   Length: 19.5  in  Observations/Comments:         Apgars:    @ 1 minute / pending at time of note       @ 5 minutes   Infant Name: [place]     Placenta, Cord, and Fluid    Placenta delivered  Spontaneous  at   8/10/2020 12:42 PM     Cord: 3 vessels  present.   Nuchal Cord?  yes; Number of nuchal loops present:  1 , reduced   Cord blood obtained: Yes    Cord gases obtained:  Yes    Cord gas results: Venous:    pH, Cord Venous   Date Value Ref Range Status   08/10/2020 7.323 pH Units Final       Arterial:    pH, Cord Arterial   Date Value Ref Range Status   08/10/2020 7.32 (H) 7.22 - 7.30 pH Units Final        Repair    Episiotomy:    No    Lacerations: Yes  Laceration Information  Laceration Repaired?   Perineal:         Periurethral:         Labial:    right   yes   Sulcus:         Vaginal:         Cervical:           Suture used for repair: 4-0 Vicryl rapide   Estimated Blood Loss:   100ml         Complications  none    Disposition  Mother to Mother  Baby/Postpartum  in stable condition currently.  Baby to NBN  in stable condition currently.      Sadie Cm CNM  08/10/20  12:54

## 2020-08-10 NOTE — ANESTHESIA PROCEDURE NOTES
Labor Epidural      Patient reassessed immediately prior to procedure    Patient location during procedure: OB  Performed By  Anesthesiologist: Rayshawn Bone DO  Preanesthetic Checklist  Completed: patient identified, site marked, surgical consent, pre-op evaluation, timeout performed, IV checked, risks and benefits discussed and monitors and equipment checked  Prep:  Pt Position:sitting  Sterile Tech:gloves, mask, sterile barrier and cap  Prep:chlorhexidine gluconate and isopropyl alcohol  Monitoring:blood pressure monitoring and continuous pulse oximetry  Epidural Block Procedure:  Approach:midline  Guidance:landmark technique and palpation technique  Location:L3-L4  Needle Type:Tuohy  Needle Gauge:17 G  Loss of Resistance Medium: air  Loss of Resistance: 4cm  Cath Depth at skin:9 cm  Paresthesia: none  Aspiration:negative  Test Dose:negative  Number of Attempts: 1  Post Assessment:  Dressing:secured with tape and occlusive dressing applied (Tegaderm Placed)  Pt Tolerance:patient tolerated the procedure well with no apparent complications  Complications:no

## 2020-08-10 NOTE — PLAN OF CARE
Problem: Patient Care Overview  Goal: Plan of Care Review  8/10/2020 1330 by Heather Bentley RN  Outcome: Ongoing (interventions implemented as appropriate)  Flowsheets (Taken 8/10/2020 1330)  Progress: improving  Plan of Care Reviewed With: patient;significant other  8/10/2020 1330 by Heather Bentley RN  Outcome: Ongoing (interventions implemented as appropriate)  Goal: Individualization and Mutuality  8/10/2020 1330 by Heather Bentley RN  Outcome: Ongoing (interventions implemented as appropriate)  8/10/2020 1330 by Heather Bentley RN  Outcome: Ongoing (interventions implemented as appropriate)  Goal: Discharge Needs Assessment  8/10/2020 1330 by Heather Bentley RN  Outcome: Ongoing (interventions implemented as appropriate)  8/10/2020 1330 by Heather Bentley RN  Outcome: Ongoing (interventions implemented as appropriate)  Goal: Interprofessional Rounds/Family Conf  8/10/2020 1330 by Heather Bentley RN  Outcome: Ongoing (interventions implemented as appropriate)  8/10/2020 1330 by Heather Bentley RN  Outcome: Ongoing (interventions implemented as appropriate)     Problem: Prenatal Patient, Hospitalized (Adult,Obstetrics,Pediatric)  Goal: Signs and Symptoms of Listed Potential Problems Will be Absent, Minimized or Managed (Prenatal Patient, Hospitalized)  8/10/2020 1330 by Heather Bentley RN  Outcome: Ongoing (interventions implemented as appropriate)  8/10/2020 1330 by Heather Bentley RN  Outcome: Ongoing (interventions implemented as appropriate)     Problem: Labor (Cervical Ripen, Induct, Augment) (Adult,Obstetrics,Pediatric)  Goal: Signs and Symptoms of Listed Potential Problems Will be Absent, Minimized or Managed (Labor)  Outcome: Outcome(s) achieved  Flowsheets (Taken 8/10/2020 1329)  Problems Present (Labor): none

## 2020-08-10 NOTE — PROGRESS NOTES
[facility]  Obstetric Progress Note    Subjective     Patient:    Resting without complaints, comfortable with epidural.     Objective     Vital Signs Range for the last 24 hours  Temp:  [97.8 °F (36.6 °C)-99 °F (37.2 °C)] 98.6 °F (37 °C)   Temp src: Oral   BP: ()/(51-78) 101/57   Heart Rate:  [] 108   Resp:  [12-16] 12   SpO2:  [84 %-100 %] 100 %           Weight:  [62.6 kg (138 lb)] 62.6 kg (138 lb)       Intake/Output this shift:    No intake/output data recorded.    Physical Exam:      Abdomen Abdominal exam: soft, nontender, nondistended, no masses or organomegaly.   Extremities Exam of extremities: peripheral pulses normal, no pedal edema, no clubbing or cyanosis     Presentation: vertex   Cervix: Exam by: Method: sterile exam per RN(Lv, RN)   Dilation:  6-7   Effacement: Cervical Effacement: 90%   Station:           Fetal Heart Rate Assessment   Method: Fetal HR Assessment Method: external   Beats/min: Fetal HR (beats/min): 155   Baseline: Fetal Heart Baseline Rate: normal range   Varibility: Fetal HR Variability: moderate (amplitude range 6 to 25 bpm)   Accels: Fetal HR Accelerations: lasting at least 15 seconds, greater than/equal to 15 bpm   Decels: Fetal HR Decelerations: absent   Tracing Category:       Uterine Assessment   Method: Method: external tocotransducer   Frequency (min): Contraction Frequency (Minutes): 2   Ctx Count in 10 min:     Duration:     Intensity: Contraction Intensity: moderate by palpation   Intensity by IUPC:     Resting Tone: Uterine Resting Tone: soft by palpation   Resting Tone by IUPC:     [place] Units:         Assessment/Plan       Pregnancy        Assessment:  1.  Intrauterine pregnancy at 39w5d weeks gestation with reactive fetal status.    2.  labor  with ROM  3.  Obstetrical history  is non-contributory.  4.  GBS status: positive  Plan:  1. Continue current plan of care  2.  Repeat SVE every 2-4 hours or prn  3.   Plan of care has been  reviewed with patient   4.  Risks, benefits of treatment plan have been discussed.  5.  All questions have been answered.        Sadie Cm CNM  8/10/2020  08:56

## 2020-08-10 NOTE — PLAN OF CARE
VSS. Fundus, lochia & laceration WDL. Pt ambulates in room independently without symptoms. Pain controlled with Motrin.

## 2020-08-10 NOTE — PLAN OF CARE
Problem: Labor (Cervical Ripen, Induct, Augment) (Adult,Obstetrics,Pediatric)  Goal: Signs and Symptoms of Listed Potential Problems Will be Absent, Minimized or Managed (Labor)  Outcome: Outcome(s) achieved  Flowsheets (Taken 8/10/2020 1326)  Problems Present (Labor): none

## 2020-08-10 NOTE — PROGRESS NOTES
[facility]  Obstetric Progress Note    Subjective     Patient:     Breathing through contractions.  Tearful at times.  She states contractions are getting closer together around every 8-10 minutes.      Objective     Vital Signs Range for the last 24 hours  Temp:  [97.8 °F (36.6 °C)-98.5 °F (36.9 °C)] 98 °F (36.7 °C)   Temp src: Oral   BP: (108-125)/(68-77) 125/68   Heart Rate:  [] 113   Resp:  [16] 16               Weight:  [62.6 kg (138 lb)] 62.6 kg (138 lb)       Intake/Output this shift:    No intake/output data recorded.    Physical Exam:      Abdomen Abdominal exam: soft, nontender, nondistended, no masses or organomegaly.   Extremities Exam of extremities: peripheral pulses normal, no pedal edema, no clubbing or cyanosis     Presentation: vertex   Cervix: Exam by: Method: sterile exam per CNM   Dilation:  2   Effacement: Cervical Effacement: 90%   Station:   -1         Fetal Heart Rate Assessment   Method: Fetal HR Assessment Method: external   Beats/min: Fetal HR (beats/min): 140   Baseline: Fetal Heart Baseline Rate: normal range   Varibility: Fetal HR Variability: minimal (detectable, amplitude less than or equal to 5 bpm)   Accels: Fetal HR Accelerations: greater than/equal to 10 bpm (32 wks gest or less), lasts at least 10 seconds (32 wks gest or less)   Decels: Fetal HR Decelerations: absent   Tracing Category:  1     Uterine Assessment   Method: Method: external tocotransducer   Frequency (min): Contraction Frequency (Minutes): 7   Ctx Count in 10 min:     Duration:     Intensity: Contraction Intensity: no contractions   Intensity by IUPC:     Resting Tone: Uterine Resting Tone: soft by palpation   Resting Tone by IUPC:     [place] Units:         Assessment/Plan       Pregnancy        Assessment:  1.  Intrauterine pregnancy at 39w4d weeks gestation with reactive fetal status currently.  Spontaneous decelerations noted during EFM.    2.  labor  with ROM  3.  Obstetrical history significant  for is non-contributory.  4.  GBS status: positive    Plan:  1.  Plan of care discussed with patient. Discussed that option for labor augmentation with pitocin is not indicated due to FHR tracing.  Discussed expectant management.   Continue observation.  Will proceed with  section if fetal distress.  Pain management options discussed.  Discussed that IV pain medication is contraindicated due to decreased fetal heart rate variability and decelerations.  She was offered an epidural but declined.  Patient was informed that the anesthesiologist would soon be starting a case in the operating room and would be unavailable.  She again declined epidural.    2.  Will proceed with expectant management at this time.  Repeat SVE every 2-4 hours or prn  3.   Plan of care has been reviewed with patient and she verbalizes understanding  4.  Risks, benefits of treatment plan have been discussed.  5.  All questions have been answered.  6.  Patient has been discussed with Dr. Ochoa and Dr. Obando.  FHR tracing reviewed with Dr. Ochoa.       Daphnie Pope CNM  2020  22:50

## 2020-08-10 NOTE — PAYOR COMM NOTE
"Beto Whittaker (20 y.o. Female)     [employer] Medicaid Auth#[id number]    From: Dominique Woodsnicolas  #[phone]  Fax#[phone]      Date of Birth Social Security Number Address Home Phone MRN    2000  [address] [phone] [id number]    Christian Marital Status          None Single       Admission Date Admission Type Admitting Provider Attending Provider Department, Room/Bed    8/9/20 Elective Freida Ochoa MD [place], Iniko Z, CNM [facility] MOTHER BABY 4A, N408/1    Discharge Date Discharge Disposition Discharge Destination                       Attending Provider:  Grant Ordaz CNM    Allergies:  No Known Allergies    Isolation:  None   Infection:  None   Code Status:  CPR    Ht:  152.4 cm (60\")   Wt:  62.6 kg (138 lb)    Admission Cmt:  None   Principal Problem:  None                Active Insurance as of 8/9/2020     Primary Coverage     Payor Plan Insurance Group Employer/Plan Group    [facility] BLUE CROSS [employer] EMPLOYEE [id number]     Payor Plan Address Payor Plan Phone Number Payor Plan Fax Number Effective Dates    [address] [phone]  1/1/2015 - None Entered    [facility] [address]       Subscriber Name Subscriber Birth Date Member ID       BETO WHITTAKER 2000 [id number]           Secondary Coverage     Payor Plan Insurance Group Employer/Plan Group    [place] [id number]     Payor Plan Address Payor Plan Phone Number Payor Plan Fax Number Effective Dates    [employer] Claims Office - [address] [address] [phone]  3/1/2020 - None Entered    [facility] [id number]       Subscriber Name Subscriber Birth Date Member ID       BETO WHITTAKER 2000 [id number]                 Emergency Contacts      (Rel.) Home Phone Work Phone Mobile Phone    Bartolome Whittaker (Father) [phone] -- --    Justin Hood (Significant Other) [phone] -- --            Insurance Information                " [facility] EMPLOYEE Phone: [phone]    Subscriber: Beatriz Rai Subscriber#: [id number]    Group#: [id number] Precert#:         [place] Phone: [phone]    Subscriber: Moses Raitara Ackerman Subscriber#: [id number]    Group#: [id number] Precert#:           Problem List           Codes Noted - Resolved       Hospital     (normal spontaneous vaginal delivery) ICD-10-CM: O80  ICD-9-CM: 650 8/10/2020 - Present             History & Physical      Stevo Davila DO at [address] [facility]          [facility]  Obstetric History and Physical    Referring Provider: No Known Provider      Chief Complaint   Patient presents with   • Contractions       Subjective     Patient is a 20 y.o. female  currently at 39w4d, who presents with C/O contractions.  She reports contractions every 5 to 8 minutes since 4 this morning without associated leaking of fluid.  She does admit to having  bloody show.  She reports normal fetal activity.  Prenatal care by BIRGIT White CNM.        The following portions of the patients history were reviewed and updated as appropriate: current medications, allergies, past medical history, past surgical history, past family history, past social history and problem list .       Prenatal Information:   Maternal Prenatal Labs  Blood Type No results found for: ABO   Rh Status No results found for: RH   Antibody Screen No results found for: ABSCRN   Gonnorhea No results found for: GCCX   Chlamydia No results found for: CLAMYDCU   RPR No results found for: RPR   Syphilis Antibody No results found for: SYPHILIS   Rubella No results found for: RUBELLAIGGIN   Hepatitis B Surface Antigen No results found for: HEPBSAG   HIV-1 Antibody No results found for: LABHIV1   Hepatitis C Antibody No results found for: HEPCAB   Rapid Urin Drug Screen No results found for: AMPMETHU, BARBITSCNUR, LABBENZSCN, LABMETHSCN, LABOPIASCN, THCURSCR,  COCAINEUR, AMPHETSCREEN, PROPOXSCN, BUPRENORSCNU, METAMPSCNUR, OXYCODONESCN, TRICYCLICSCN   Group B Strep Culture No results found for: GBSANTIGEN           External Prenatal Results     Pregnancy Outside Results - Transcribed From Office Records - See Scanned Records For Details     Test Value Date Time    Hgb 12.9 g/dL 20 1011      14.5 g/dL 20 1340    Hct 35.8 % 20 1011      41.4 % 20 1340    ABO AB  20 1340    Rh Positive  20 1340    Antibody Screen Negative  20 1340    Glucose Fasting GTT       Glucose Tolerance Test 1 hour       Glucose Tolerance Test 3 hour       Gonorrhea (discrete) Negative  18 1300    Chlamydia (discrete) Negative  18 1300    RPR Non-Reactive  20 1340    VDRL       Syphilis Antibody       Rubella Positive  20 1340    HBsAg Non-Reactive  20 1340    Herpes Simplex Virus PCR       Herpes Simplex VIrus Culture       HIV Non-Reactive  20 1340    Hep C RNA Quant PCR       Hep C Antibody Non-Reactive  20 1340    AFP       Group B Strep       GBS Susceptibility to Clindamycin       GBS Susceptibility to Erythromycin       Fetal Fibronectin       Genetic Testing, Maternal Blood             Drug Screening     Test Value Date Time    Urine Drug Screen       Amphetamine Screen Negative  20 1340    Barbiturate Screen Negative  20 1340    Benzodiazepine Screen Negative  20 1340    Methadone Screen Negative  20 1340    Phencyclidine Screen Negative  20 1340    Opiates Screen Negative  20 1340    THC Screen Negative  20 1340    Cocaine Screen       Propoxyphene Screen Negative  20 1340    Buprenorphine Screen Negative  20 1340    Methamphetamine Screen       Oxycodone Screen Negative  20 1340    Tricyclic Antidepressants Screen Negative  20 1340                  Past OB History:       OB History    Para Term  AB Living   1 0 0 0 0 0   SAB TAB  Ectopic Molar Multiple Live Births   0 0 0 0 0 0      # Outcome Date GA Lbr Bautista/2nd Weight Sex Delivery Anes PTL Lv   1 Current                Past Medical History: Past Medical History:   Diagnosis Date   • Anxiety    • Bacterial vaginosis     REPORTS DIAGNOSED 12-11-18 AND THAT SHE WILL TAKE FLAGYL PER MD INSTRUCTIONS   • Body piercing     NOSE, EARS, BELLY BUTTON   • Closed right ankle fracture 2014    REPORTS INVOLVEMENT OF GROWTH PLATE   • Closed right arm fracture 2006   • Headache    • History of pneumonia    • Tachycardia     REPORTS SHE IS UNDER CARE OF DR GÓMEZ WITH MEDICATION    • Tattoos       Past Surgical History Past Surgical History:   Procedure Laterality Date   • ANKLE OPEN REDUCTION INTERNAL FIXATION Right 09/25/2014    MD Jose Rafael   • APPENDECTOMY     • HARDWARE REMOVAL Right 12/14/2018    Procedure: Elective open removal of hardware from right ankle distal fibula;  Surgeon: Stevo Clark MD;  Location: [facility];  Service: Orthopedics      Family History: Family History   Problem Relation Age of Onset   • Coronary artery disease Father    • Diabetes Father    • Hypertension Father    • No Known Problems Mother    • Throat cancer Paternal Grandfather    • Heart attack Paternal Grandmother    • No Known Problems Maternal Grandmother    • No Known Problems Maternal Grandfather    • Osteoarthritis Other    • Rheumatologic disease Other       Social History:  reports that she has never smoked. She has never used smokeless tobacco.   reports that she drank alcohol.   reports that she does not use drugs.                   General ROS Negative Findings:Headaches, Visual Changes, Epigastric pain, Anorexia, Nausia/Vomiting, ROM and Vaginal Bleeding    ROS     All other systems have been reviewed and are neg  Objective       Vital Signs Range for the last 24 hours  Temperature: Temp:  [98.5 °F (36.9 °C)] 98.5 °F (36.9 °C)   Temp Source: Temp src: Oral   BP: BP: (116)/(75) 116/75   Pulse: Heart  Rate:  [101] 101   Respirations: Resp:  [16] 16   SPO2:     O2 Amount (l/min):     O2 Devices     Weight: Weight:  [62.6 kg (138 lb)] 62.6 kg (138 lb)     Physical Examination:   General:   alert, appears stated age and cooperative   Skin:   normal   HEENT:  Sclera clear   Lungs:   clear to auscultation bilaterally   Heart:   regular rate and rhythm, S1, S2 normal, no murmur, click, rub or gallop   Abdomen:  Soft, gravid uterus, guarding, rebound benign exam   Lower Extremities  no edema, no calf transfer range of motion   Pelvis:  External genitalia: normal general appearance  Uterus: enlarged         Presentation: vtx   Cervix: Exam by: Method: sterile exam per physician(Dr. Davila)   Dilation:  1   Effacement: Cervical Effacement: 70-80%   Station:  -1    Bloody show noted.       Fetal Heart Rate Assessment   Method: Fetal HR Assessment Method: external   Beats/min: Fetal HR (beats/min): 145   Baseline: Fetal Heart Baseline Rate: normal range   Varibility: Fetal HR Variability: minimal (detectable, amplitude less than or equal to 5 bpm)   Accels: Fetal HR Accelerations: absent   Decels: Fetal HR Decelerations: absent   Tracing Category:       Uterine Assessment   Method: Method: external tocotransducer   Frequency (min): Contraction Frequency (Minutes): 3-5   Ctx Count in 10 min:     Duration:     Intensity: Contraction Intensity: mild by palpation   Intensity by IUPC:     Resting Tone: Uterine Resting Tone: soft by palpation   Resting Tone by IUPC:     [place] Units:       Laboratory Results:   Lab Results (last 24 hours)     ** No results found for the last 24 hours. **        Radiology Review:   Imaging Results (Last 24 Hours)     ** No results found for the last 24 hours. **        Other Studies:    Assessment/Plan       Pregnancy        Assessment:  1.  Intrauterine pregnancy at 39w4d weeks gestation with reactive, late decelerations present fetal status.    2.  GBS positive  3.  Early labor  4.       Plan:  1. Admit, GBS prophylaxis, IV, labs, expectant management for now    2. Plan of care has been reviewed with patient.  3.  Risks, benefits of treatment plan have been discussed.  4.  All questions have been answered.  5   discussed with MEERA Sharpe DO  8/9/2020  14:46    Electronically signed by Stevo Davila DO at 08/09/20 [id number]       Vital Signs (last day)     Date/Time   Temp   Temp src   Pulse   Resp   BP   Patient Position   SpO2    08/10/20 1351   --   --   93   16   128/79   Lying   --    08/10/20 1335   --   --   80   16   117/73   Lying   --    08/10/20 1321   --   --   83   16   115/68   Lying   --    08/10/20 1306   --   --   (!) 142   16   119/57   Lying   --    08/10/20 1251   99 (37.2)   Oral   84   16   126/59   Lying   --    08/10/20 1206   --   --   115   --   122/82   --   --    08/10/20 1152   --   --   105   --   115/79   --   --    08/10/20 1135   --   --   101   --   112/75   --   --    08/10/20 1120   --   --   100   --   110/73   --   --    08/10/20 1105   --   --   118   --   110/71   --   --    08/10/20 1051   --   --   111   --   118/75   --   --    08/10/20 1036   --   --   116   --   112/71   --   --    08/10/20 1022   --   --   117   --   121/74   --   --    08/10/20 1006   --   --   115   --   125/75   --   --    08/10/20 0952   --   --   107   --   121/73   --   --    08/10/20 0937   --   --   89   --   120/75   --   --    08/10/20 0921   98.9 (37.2)   Oral   116   14   103/62   Lying   --    08/10/20 0906   --   --   107   --   103/58   --   --    08/10/20 0851   --   --   108   --   101/57   --   --    08/10/20 0836   --   --   116   --   102/58   --   --    08/10/20 0821   --   --   111   --   102/57   --   --    08/10/20 0806   --   --   115   --   103/55   --   --    08/10/20 0751   --   --   (!) 136   --   110/58   --   --    08/10/20 0736   --   --   (!) 127   --   106/57   --   --    08/10/20 0720   98.6 (37)   Oral   113   12    102/66   Lying   --    08/10/20 0706   --   --   108   --   107/64   --   --    08/10/20 0651   --   --   108   --   111/65   --   --    08/10/20 0636   --   --   117   --   109/62   --   --    08/10/20 0620   99 (37.2)   Oral   (!) 121   16   91/54   Lying   --    08/10/20 0606   --   --   (!) 127   --   90/51   --   --    08/10/20 0550   --   --   109   --   98/62   --   --    08/10/20 0539   --   --   114   --   95/58   --   --    08/10/20 0520   --   --   113   --   94/54   --   --    08/10/20 0511   --   --   (!) 136   --   92/54   --   --    08/10/20 0451   --   --   (!) 139   --   107/63   --   --    08/10/20 0436   --   --   94   --   110/58   --   --    08/10/20 0421   --   --   110   --   108/58   --   --    08/10/20 0405   --   --   116   --   107/63   --   --    08/10/20 0351   --   --   110   --   106/60   --   --    08/10/20 0336   --   --   108   --   106/60   --   --    08/10/20 0321   --   --   (!) 123   --   107/62   --   --    08/10/20 0307   --   --   104   --   109/69   --   --    08/10/20 0251   --   --   104   --   110/71   --   --    08/10/20 0221   98.7 (37.1)   Oral   114   16   102/56   Lying   --    08/10/20 0206   --   --   117   --   98/56   --   --    08/10/20 0150   --   --   108   --   105/61   --   --    08/10/20 0138   --   --   100   --   100/56   --   --    08/10/20 0136   --   --   (!) 127   --   (!) 85/51   --   --    08/10/20 0120   --   --   96   --   118/76   --   --    08/10/20 0106   --   --   114   --   117/71   --   --    08/10/20 0050   --   --   107   --   112/78   --   --    08/10/20 0042   --   --   112   --   100/52   --   100    08/10/20 0040   --   --   119   --   --   --   100    08/10/20 0038   --   --   --   --   113/56   --   --    08/10/20 0035   --   --   114   --   102/55   --   100    08/10/20 0032   --   --   (!) 136   --   107/60   --   --    08/10/20 0030   --   --   (!) 128   --   --   --   100    08/10/20 0029   --   --   (!) 133   --   122/57   --    --    08/10/20 0026   --   --   (!) 151   --   119/74   --   --    08/10/20 0025   --   --   (!) 155   --   --   --   100    08/10/20 0023   --   --   (!) 141   --   125/72   --   --    08/10/20 0021   --   --   103   --   --   --   (!) 84    08/10/20 0020   --   --   (!) 125   --   120/73   --   100    08/10/20 0015   --   --   (!) 130   --   --   --   100    08/09/20 2337   98.4 (36.9)   Oral   90   16   117/76   Sitting   --    08/09/20 1922   98 (36.7)   Oral   113   16   125/68   Sitting   --    08/09/20 1710   --   --   86   16   108/70   --   --    08/09/20 1502   97.8 (36.6)   Oral   100   16   116/77   --   --    08/09/20 1214   98.5 (36.9)   Oral   101   16   116/75   Lying   --                Current Facility-Administered Medications   Medication Dose Route Frequency Provider Last Rate Last Dose   • acetaminophen (TYLENOL) tablet 650 mg  650 mg Oral Q4H PRN Sadie Cm CNM       • benzocaine (AMERICAINE) 20 % rectal ointment 1 application  1 application Rectal PRN Sadie Cm CNM       • benzocaine-menthol (DERMOPLAST) 20-0.5 % topical spray   Topical PRN Sadie Cm CNM       • [START ON 8/11/2020] bisacodyl (DULCOLAX) suppository 10 mg  10 mg Rectal Daily PRN Sadie Cm CNM       • docusate sodium (COLACE) capsule 100 mg  100 mg Oral BID Sadie Cm CNM       • Hydrocortisone (Perianal) (ANUSOL-HC) 2.5 % rectal cream 1 application  1 application Rectal PRN Sadie Cm CNM       • ibuprofen (ADVIL,MOTRIN) tablet 600 mg  600 mg Oral Q6H PRN Sadie Cm CNM       • lactated ringers infusion  125 mL/hr Intravenous Continuous Stevo Davila  mL/hr at 08/10/20 1021 125 mL/hr at 08/10/20 1021   • lanolin cream   Topical Q1H PRN Sadie Cm CNM       • magnesium hydroxide (MILK OF MAGNESIA) suspension 2400 mg/10mL 10 mL  10 mL Oral Daily PRN Sadie Cm CNM       • ondansetron (ZOFRAN) tablet 4 mg  4 mg Oral Q8H PRN Sadie Cm CNM       •  ropivacaine (NAROPIN) 0.2 % injection  15 mL/hr Epidural Continuous Rayshawn Bone, DO 15 mL/hr at 08/10/20 0028     • sodium chloride 0.9 % flush 1-10 mL  1-10 mL Intravenous PRN Sadie Cm CNM       • Tdap (BOOSTRIX) injection 0.5 mL  0.5 mL Intramuscular During Hospitalization Sadie Cm CNM       • witch hazel-glycerin (TUCKS) pad 1 pad  1 each Topical PRN Sadie Cm CNM         Facility-Administered Medications Ordered in Other Encounters   Medication Dose Route Frequency Provider Last Rate Last Dose   • bupivacaine (PF) (MARCAINE) 0.25 % injection    PRN Rayshawn Bone, DO   12 mL at 08/10/20 0023   • fentaNYL citrate (PF) (SUBLIMAZE) injection    PRN Rayshawn Bone, DO   100 mcg at 08/10/20 0021   • lidocaine PF 1% (XYLOCAINE) injection    PRN Rayshawn Bone, DO   5 mL at 08/10/20 0017   • lidocaine-EPINEPHrine (XYLOCAINE W/EPI) 1.5 %-1:154311 injection   Injection PRN Rayshawn Bone, DO   2 mL at 08/10/20 0020       Lab Results (last 24 hours)     Procedure Component Value Units Date/Time    Blood Gas, Venous, Cord [[id number]]  (Abnormal) Collected:  08/10/20 1252    Specimen:  Cord Blood Venous from Umbilical Cord Updated:  08/10/20 1252     Site Umbilical     pH, Cord Venous 7.323 pH Units      pCO2, Cord Venous 44.9 mm Hg      pO2, Cord Venous 14.9 mm Hg      HCO3, Cord Venous 23.3 mmol/L      Base Excess, Cord Venous -2.9 mmol/L      O2 Sat, Cord Venous 27.3 %      Hemoglobin, Blood Gas 14.9 g/dL      CO2 Content 24.7 mmol/L      Temperature 37.0 C      Barometric Pressure for Blood Gas --     Comment: N/A        Modality Room Air     Comment: Meter: [id number]     :  [id number]        FIO2 21 %      O2 Saturation Calculated --     Comment: Calculated O2 saturation result not reported at this site.        Note --     Collection Time --    Blood Gas, Arterial, Cord [[id number]]  (Abnormal) Collected:  08/10/20 1250    Specimen:  Cord Blood Arterial from  Umbilical Cord Updated:  08/10/20 1250     Site Umbilical     pH, Cord Arterial 7.32 pH Units      pCO2, Cord Arterial 44.8 mmHg      pO2, Cord Arterial 15.7 mmHg      HCO3, Cord Arterial 23.2 mmol/L      Base Exc, Cord Arterial -3.0 mmol/L      O2 Sat, Cord Arterial 28.8 %      Hemoglobin, Blood Gas 14.8 g/dL      CO2 Content 24.6 mmol/L      Temperature 37.0 C      Barometric Pressure for Blood Gas --     Comment: N/A        Modality Room Air     Comment: Meter: [id number]     :  [id number]        FIO2 21 %      Note --    Group B Streptococcus Culture - Swab, Vaginal/Rectum [[id number]] Resulted:  07/14/20     Specimen:  Swab from Vaginal/Rectum Updated:  08/09/20 2316     External Strep Group B Ag Positive    Preeclampsia Panel [[id number]]  (Abnormal) Collected:  08/09/20 1510    Specimen:  Blood Updated:  08/09/20 1545     Alkaline Phosphatase 299 U/L      ALT (SGPT) 16 U/L      AST (SGOT) 23 U/L      Creatinine 0.60 mg/dL      Total Bilirubin 0.3 mg/dL       U/L      Uric Acid 5.9 mg/dL     CBC (No Diff) [[id number]]  (Abnormal) Collected:  08/09/20 1510    Specimen:  Blood Updated:  08/09/20 1521     WBC 15.46 10*3/mm3      RBC 4.12 10*6/mm3      Hemoglobin 11.6 g/dL      Hematocrit 34.7 %      MCV 84.2 fL      MCH 28.2 pg      MCHC 33.4 g/dL      RDW 12.9 %      RDW-SD 39.3 fl      MPV 11.1 fL      Platelets 189 10*3/mm3         Orders (last 24 hrs)      Start     Ordered    08/11/20 0800  Sitz Bath  3 Times Daily     Comments:  PRN    08/10/20 1423    08/11/20 0600  CBC & Differential  Timed     Comments:  Postpartum Day 1      08/10/20 1423    08/11/20 0000  bisacodyl (DULCOLAX) suppository 10 mg  Daily PRN      08/10/20 1423    08/10/20 2100  docusate sodium (COLACE) capsule 100 mg  2 Times Daily      08/10/20 1423    08/10/20 1424  Code Status and Medical Interventions:  Continuous      08/10/20 1423    08/10/20 1424  Vital Signs Per hospital policy  Per Hospital Policy      08/10/20  1423    08/10/20 1424  Up Ad Sammie  Until Discontinued      08/10/20 1423    08/10/20 1424  Ambulate Patient  Every Shift      08/10/20 1423    08/10/20 1424  Fundal and Lochia Check  Per Hospital Policy     Comments:  Q 15 min x 4, Q 30 min x 2, then Q Shift    08/10/20 1423    08/10/20 1424  RN to Assess Rh Status & Place RhIG Evaluation Order if Indicated  Continuous      08/10/20 1423    08/10/20 1424  Bladder Assessment  Per Order Details     Comments:  Postpartum 1) Upon Admission to Unit & Every 4 Hours PRN Until Voiding. 2) Out of Bed to Void in 8 Hours.    08/10/20 1423    08/10/20 1424  Straight Cath  Per Order Details     Comments:  Postpartum: If Distended & Unable to Void, May Repeat Once.    08/10/20 1423    08/10/20 1424  Indwelling Urinary Catheter  Per Order Details     Comments:  Postpartum : After Straight Cathed x2 or if Greater Than 1000mL Residual, Insert Indwelling Urinary Catheter Until Further MD Order.    08/10/20 1423    08/10/20 1424  Remove Vaginal Packing  Once      08/10/20 1423    08/10/20 1424  Breast pump to bed  Once      08/10/20 1423    08/10/20 1424  Notify Physician  Until Discontinued      08/10/20 1423    08/10/20 1424  Diet Regular  Diet Effective Now      08/10/20 1423    08/10/20 1424  Advance Diet as Tolerated  Until Discontinued      08/10/20 1423    08/10/20 1424  If indicated -- Please administer RH Immunoglobulin based on results of cord blood evaluation and fetal screen lab tests, pharmacy to dispense  Per Order Details     Comments:  See Process Instructions For Reference Range Details.    08/10/20 1423    08/10/20 1423  sodium chloride 0.9 % flush 1-10 mL  As Needed      08/10/20 1423    08/10/20 1423  ibuprofen (ADVIL,MOTRIN) tablet 600 mg  Every 6 Hours PRN      08/10/20 1423    08/10/20 1423  magnesium hydroxide (MILK OF MAGNESIA) suspension 2400 mg/10mL 10 mL  Daily PRN      08/10/20 1423    08/10/20 1423  ondansetron (ZOFRAN) tablet 4 mg  Every 8 Hours PRN       08/10/20 1423    08/10/20 1423  lanolin cream  Every 1 Hour PRN      08/10/20 1423    08/10/20 1423  benzocaine-menthol (DERMOPLAST) 20-0.5 % topical spray  As Needed      08/10/20 1423    08/10/20 1423  witch hazel-glycerin (TUCKS) pad 1 pad  As Needed      08/10/20 1423    08/10/20 1423  Hydrocortisone (Perianal) (ANUSOL-HC) 2.5 % rectal cream 1 application  As Needed      08/10/20 1423    08/10/20 1423  benzocaine (AMERICAINE) 20 % rectal ointment 1 application  As Needed      08/10/20 1423    08/10/20 1423  Tdap (BOOSTRIX) injection 0.5 mL  During Hospitalization      08/10/20 1423    08/10/20 1423  acetaminophen (TYLENOL) tablet 650 mg  Every 4 Hours PRN      08/10/20 1423    08/10/20 1345  oxytocin in sodium chloride (PITOCIN) 30 UNIT/500ML infusion solution  Once      08/10/20 1245    08/10/20 1345  oxytocin in sodium chloride (PITOCIN) 30 UNIT/500ML infusion solution  Once,   Status:  Discontinued      08/10/20 1245    08/10/20 1300  Transfer Patient  Once      08/10/20 1300    08/10/20 1300  VTE Prophylaxis Not Indicated: No Risk Factors (0); </= 3 (Low Risk)  Once      08/10/20 1300    08/10/20 1253  Blood Gas, Venous, Cord  Once      08/10/20 1252    08/10/20 1251  Blood Gas, Arterial, Cord  Once      08/10/20 1250    08/10/20 1246  Vital Signs Per Hospital Policy  Per Hospital Policy      08/10/20 1245    08/10/20 1246  Fundal & Lochia Check  Per Order Details     Comments:  Every 15 Minutes x4, Then Every 30 Minutes x2, Then Every Shift    08/10/20 1245    08/10/20 1246  Fundal & Lochia Check  Every Shift      08/10/20 1245    08/10/20 1246  Notify Provider (Specified)  Until Discontinued      08/10/20 1245    08/10/20 1246  Diet Regular  Diet Effective Now,   Status:  Canceled      08/10/20 1245    08/10/20 1246  Nurse May Remove Epidural Catheter After Delivery  Continuous      08/10/20 1245    08/10/20 1246  Transfer to postpartum when discharge criteria met.  Until Discontinued      08/10/20 124     08/10/20 1245  methylergonovine (METHERGINE) injection 200 mcg  Once As Needed,   Status:  Discontinued      08/10/20 1245    08/10/20 1245  carboprost (HEMABATE) injection 250 mcg  As Needed,   Status:  Discontinued      08/10/20 1245    08/10/20 1245  miSOPROStol (CYTOTEC) tablet 800 mcg  As Needed,   Status:  Discontinued      08/10/20 1245    08/10/20 0045  Sod Citrate-Citric Acid (BICITRA) solution 30 mL  Once,   Status:  Discontinued      08/09/20 2352    08/10/20 0045  ropivacaine (NAROPIN) 0.2 % injection  Continuous      08/09/20 2352 08/09/20 2352  Vital Signs Per Anesthesia Guidelines  Per Hospital Policy     Comments:  Every 3 Minutes x 20 Minutes following epidural dosing, then if stable every 15 Minutes    08/09/20 2352 08/09/20 2352  Start IV (16 or 18 Gauge)  Once      08/09/20 2352 08/09/20 2352  Fetal Heart Rate Monitor  Once      08/09/20 2352    08/09/20 2352  Nurse or Anesthesiologist to Remain With Patient for 15 Minutes Following Dosing  Continuous      08/09/20 2352    08/09/20 2352  Facilitate Maternal Position on Side & Maintain Uterine Displacement  Continuous      08/09/20 2352    08/09/20 2352  Consult Anesthesia Prior to Changing Epidural Infusion / Rate  Continuous      08/09/20 2352    08/09/20 2351  lactated ringers bolus 1,000 mL  As Needed      08/09/20 2352 08/09/20 2351  ePHEDrine Sulfate 25 MG/5ML syringe 10 mg  Every 10 Minutes PRN,   Status:  Discontinued      08/09/20 2352 08/09/20 2351  diphenhydrAMINE (BENADRYL) injection 12.5 mg  Every 8 Hours PRN,   Status:  Discontinued      08/09/20 2352 08/09/20 2351  metoclopramide (REGLAN) injection 10 mg  Once As Needed,   Status:  Discontinued      08/09/20 2352 08/09/20 2351  ondansetron (ZOFRAN) injection 4 mg  Once As Needed      08/09/20 2352 08/09/20 2351  famotidine (PEPCID) injection 20 mg  Once As Needed,   Status:  Discontinued      08/09/20 2352 08/09/20 2100  sodium chloride 0.9 % flush 3 mL   Every 12 Hours Scheduled,   Status:  Discontinued      08/09/20 1445    08/09/20 2000  penicillin G in iso-osmotic dextrose IVPB 3 million units (premix)  Every 4 Hours,   Status:  Discontinued      08/09/20 1445    08/09/20 1651  Diet Regular  Diet Effective Now,   Status:  Canceled      08/09/20 1651    08/09/20 1651  DIET MESSAGE Please call Pt to get order for dinner  Once     Comments:  Please call Pt to get order for dinner    08/09/20 1651    08/09/20 1545  lactated ringers bolus 1,000 mL  Once,   Status:  Discontinued      08/09/20 1445    08/09/20 1545  lactated ringers infusion  Continuous      08/09/20 1445    08/09/20 1545  mineral oil liquid 30 mL  Once,   Status:  Discontinued      08/09/20 1445    08/09/20 1530  penicillin g 5 mu/100 mL 0.9% NS IVPB (mbp)  Once      08/09/20 1445    08/09/20 1435  lidocaine PF 1% (XYLOCAINE) injection 5 mL  As Needed,   Status:  Discontinued      08/09/20 1445    08/09/20 1435  sodium chloride 0.9 % flush 10 mL  As Needed,   Status:  Discontinued      08/09/20 1445    08/09/20 0000  Group B Streptococcus Culture - Swab, Vaginal/Rectum     Comments:  This is an external result entered through the Results Console.      08/09/20 2316    Unscheduled  Position Change - For Intra-Uterine Resusitation for Hypertonus, HyperStimulation or Non-Reassuring Fetal Status  As Needed      08/09/20 1445    Unscheduled  Apply Ice to Perineum  As Needed     Comments:  For 20 min q 2 hrs    08/10/20 1423    Unscheduled  Waffle Cushion  As Needed     Comments:  For perineal discomfort    08/10/20 1423    Unscheduled  Donut Ring  As Needed     Comments:  For perineal pain    08/10/20 1423    Unscheduled  Kpad  As Needed     Comments:  For pain    08/10/20 1423    Unscheduled  Warm compress  As Needed      08/10/20 1423    --  Omega-3 Fatty Acids (FISH OIL) 1000 MG capsule capsule  Daily With Breakfast      08/09/20 1208    --  sertraline (ZOLOFT) 25 MG tablet  Daily      08/09/20 1208                    Operative/Procedure Notes (last 24 hours) (Notes from 08/09/20 1439 through 08/10/20 1439)      Sadie Cm CNM at 08/10/20 1254          [facility]  Vaginal Delivery Note    Delivery     Delivery: Vaginal, Spontaneous     YOB: 2021    Time of Birth:  Gestational Age 12:35 PM   39w5d     Anesthesia: Epidural     Delivering clinician: Sadie Cm    Forceps?   No   Vacuum? No    Shoulder dystocia present: No        Delivery narrative:  Beatriz pushed effectively to deliver a live born male infant over an intact perineum with a labor epidural. Upon delivery of infant's head a loose nuchal cord was encountered and easily reduced. Infant's anterior shoulder and body delivered atraumatically over next push. Infant non vigorous at delivery, terminal meconium noted. Cord double clamped and cut and infant handed to awaiting nurses for further resuscitation. Placenta delivered spontaneously and was visually intact. Fundus firm. Right labial laceration repaired with 4-0 vicryl rapide. EBL 100ml    Infant    Findings: male  infant     Infant observations: Weight: 3255 g (7 lb 2.8 oz)   Length: 19.5  in  Observations/Comments:         Apgars:    @ 1 minute / pending at time of note       @ 5 minutes   Infant Name: [place]     Placenta, Cord, and Fluid    Placenta delivered  Spontaneous  at   8/10/2020 12:42 PM     Cord: 3 vessels  present.   Nuchal Cord?  yes; Number of nuchal loops present:  1 , reduced   Cord blood obtained: Yes    Cord gases obtained:  Yes    Cord gas results: Venous:    pH, Cord Venous   Date Value Ref Range Status   08/10/2020 7.323 pH Units Final       Arterial:    pH, Cord Arterial   Date Value Ref Range Status   08/10/2020 7.32 (H) 7.22 - 7.30 pH Units Final        Repair    Episiotomy:    No    Lacerations: Yes  Laceration Information  Laceration Repaired?   Perineal:         Periurethral:         Labial:    right   yes   Sulcus:         Vaginal:          Cervical:           Suture used for repair: 4-0 Vicryl rapide   Estimated Blood Loss:   100ml         Complications  none    Disposition  Mother to Mother Baby/Postpartum  in stable condition currently.  Baby to NBN  in stable condition currently.      Sadie Cm CNM  08/10/20  12:54        Electronically signed by Sadie Cm CNM at 08/10/20 1257       Physician Progress Notes (last 24 hours) (Notes from 08/09/20 1439 through 08/10/20 1439)    No notes of this type exist for this encounter.

## 2020-08-10 NOTE — LACTATION NOTE
08/10/20 1515   Maternal Information   Date of Referral 08/10/20   Person Making Referral other (see comments)  (courtesy)   Maternal Reason for Referral breastfeeding currently   Maternal Assessment   Breast Size Issue none   Breast Shape Bilateral:;round   Breast Density Bilateral:;soft   Nipples Bilateral:;everted   Maternal Infant Feeding   Maternal Emotional State assist needed   Infant Positioning cross-cradle   Signs of Milk Transfer audible swallow;infant jaw motion present   Pain with Feeding no   Comfort Measures Before/During Feeding infant position adjusted;latch adjusted;maternal position adjusted   Latch Assistance yes   Equipment Type   Breast Pump Type double electric, personal   Reproductive Interventions   Breast Care: Breastfeeding frequency of feeding adjusted   Breastfeeding Assistance assisted with positioning;support offered;feeding cue recognition promoted;feeding on demand promoted;feeding session observed;infant latch-on verified;infant stimulated to wakeful state;infant suck/swallow verified   Breastfeeding Support lactation counseling provided   Coping/Psychosocial Interventions   Parent/Child Attachment Promotion cue recognition promoted;face-to-face positioning promoted;participation in care promoted;skin-to-skin contact encouraged   Supportive Measures counseling provided

## 2020-08-11 LAB
BASOPHILS # BLD AUTO: 0.07 10*3/MM3 (ref 0–0.2)
BASOPHILS NFR BLD AUTO: 0.3 % (ref 0–1.5)
DEPRECATED RDW RBC AUTO: 41.5 FL (ref 37–54)
EOSINOPHIL # BLD AUTO: 0.07 10*3/MM3 (ref 0–0.4)
EOSINOPHIL NFR BLD AUTO: 0.3 % (ref 0.3–6.2)
ERYTHROCYTE [DISTWIDTH] IN BLOOD BY AUTOMATED COUNT: 13.4 % (ref 12.3–15.4)
HCT VFR BLD AUTO: 35.2 % (ref 34–46.6)
HGB BLD-MCNC: 11.2 G/DL (ref 12–15.9)
IMM GRANULOCYTES # BLD AUTO: 0.2 10*3/MM3 (ref 0–0.05)
IMM GRANULOCYTES NFR BLD AUTO: 0.9 % (ref 0–0.5)
LYMPHOCYTES # BLD AUTO: 2.34 10*3/MM3 (ref 0.7–3.1)
LYMPHOCYTES NFR BLD AUTO: 10.8 % (ref 19.6–45.3)
MCH RBC QN AUTO: 27.4 PG (ref 26.6–33)
MCHC RBC AUTO-ENTMCNC: 31.8 G/DL (ref 31.5–35.7)
MCV RBC AUTO: 86.1 FL (ref 79–97)
MONOCYTES # BLD AUTO: 1.1 10*3/MM3 (ref 0.1–0.9)
MONOCYTES NFR BLD AUTO: 5.1 % (ref 5–12)
NEUTROPHILS NFR BLD AUTO: 17.84 10*3/MM3 (ref 1.7–7)
NEUTROPHILS NFR BLD AUTO: 82.6 % (ref 42.7–76)
NRBC BLD AUTO-RTO: 0 /100 WBC (ref 0–0.2)
PLATELET # BLD AUTO: 206 10*3/MM3 (ref 140–450)
PMV BLD AUTO: 11.4 FL (ref 6–12)
RBC # BLD AUTO: 4.09 10*6/MM3 (ref 3.77–5.28)
WBC # BLD AUTO: 21.62 10*3/MM3 (ref 3.4–10.8)

## 2020-08-11 PROCEDURE — 85025 COMPLETE CBC W/AUTO DIFF WBC: CPT | Performed by: NURSE PRACTITIONER

## 2020-08-11 RX ADMIN — IBUPROFEN 600 MG: 600 TABLET, FILM COATED ORAL at 20:07

## 2020-08-11 RX ADMIN — IBUPROFEN 600 MG: 600 TABLET, FILM COATED ORAL at 11:56

## 2020-08-11 RX ADMIN — ACETAMINOPHEN 650 MG: 325 TABLET, FILM COATED ORAL at 22:48

## 2020-08-11 RX ADMIN — IBUPROFEN 600 MG: 600 TABLET, FILM COATED ORAL at 05:23

## 2020-08-11 RX ADMIN — DOCUSATE SODIUM 100 MG: 100 CAPSULE, LIQUID FILLED ORAL at 20:08

## 2020-08-11 RX ADMIN — DOCUSATE SODIUM 100 MG: 100 CAPSULE, LIQUID FILLED ORAL at 08:28

## 2020-08-11 RX ADMIN — WITCH HAZEL 1 PAD: 500 SOLUTION RECTAL; TOPICAL at 18:31

## 2020-08-11 RX ADMIN — ACETAMINOPHEN 650 MG: 325 TABLET, FILM COATED ORAL at 16:56

## 2020-08-11 NOTE — PROGRESS NOTES
8/11/2020  PPD #1    Subjective   Beatriz feels well.  Patient describes her lochia as less than menses.  Pain is well controlled  She is breastfeeding.      Objective   Temp: Temp:  [97.7 °F (36.5 °C)-99.2 °F (37.3 °C)] 97.8 °F (36.6 °C) Temp src: Oral   BP: BP: ()/(53-82) 105/65        Pulse: Heart Rate:  [] 90  RR: Resp:  [14-18] 14    General:  No acute distress   Abdomen: Fundus firm and beneath umbilicus   Pelvis: deferred     Lab Results   Component Value Date    WBC 15.46 (H) 08/09/2020    HGB 11.6 (L) 08/09/2020    HCT 34.7 08/09/2020    MCV 84.2 08/09/2020     08/09/2020    URICACID 5.9 (H) 08/09/2020    AST 23 08/09/2020    ALT 16 08/09/2020     (H) 08/09/2020    HEPBSAG Non-Reactive 02/07/2020     Results from last 7 days   Lab Units 08/09/20  1510   ABO TYPING  AB   RH TYPING  Positive   ANTIBODY SCREEN  Negative       Assessment  1. PPD# 1 after vaginal delivery    Plan  1. Supportive care, anticipate d/c in am.      This note has been electronically signed.    Grant Ordaz CNM  08:46  August 11, 2020

## 2020-08-11 NOTE — LACTATION NOTE
08/11/20 1630   Maternal Information   Person Making Referral nurse;patient   Maternal Reason for Referral   (worried about baby not eating)   Infant Reason for Referral   (baby has fed 2 times since delivery--almost 30 hours ago)   Maternal Assessment   Breast Size Issue none   Breast Shape Bilateral:;round   Breast Density Bilateral:;soft   Nipples Bilateral:;short;graspable   Maternal Infant Feeding   Maternal Emotional State assist needed;tense   Infant Positioning clutch/football;cross-cradle  (football right; cross cradle left)   Signs of Milk Transfer infant jaw motion present   Pain with Feeding no   Comfort Measures Before/During Feeding infant position adjusted;latch adjusted;maternal position adjusted  (small nipple shield)   Latch Assistance yes   Equipment Type   Breast Pump Type double electric, personal  (at home)   Reproductive Interventions   Breastfeeding Assistance assisted with positioning;feeding cue recognition promoted;feeding on demand promoted;feeding session observed;infant latch-on verified;nipple shield utilized;support offered;supplemental feeding provided  (gave 10 mL--most given at breast while breastfeeding)   Breastfeeding Support encouragement provided;lactation counseling provided   Breast Pumping   Breast Pumping Interventions   (pump after feedings after discharge home)   Reviewed teaching as documented under  Demonstrated finger/syringe feeding. Encoruaged to feed every 3 hours--breastfeed 10-15 minutes per side/supplement with 5 mL at each breast. Education. Encouraged as much skin to skin as possible. To call for lactation services, if there are questions or concerns or if mom wants help with a feeding tomorrow.

## 2020-08-11 NOTE — ANESTHESIA POSTPROCEDURE EVALUATION
Patient: Beatriz Rai    Procedure Summary     Date:  08/10/20 Room / Location:      Anesthesia Start:  0007 Anesthesia Stop:  1242    Procedure:  LABOR ANALGESIA Diagnosis:      Scheduled Providers:   Provider:  Rayshawn Bone DO    Anesthesia Type:  epidural ASA Status:  2          Anesthesia Type: epidural    Vitals  Vitals Value Taken Time   /65 8/11/2020  8:00 AM   Temp 97.8 °F (36.6 °C) 8/11/2020  8:00 AM   Pulse 90 8/11/2020  8:00 AM   Resp 14 8/11/2020  8:00 AM   SpO2 100 % 8/10/2020 12:42 AM           Post Anesthesia Care and Evaluation    Patient location during evaluation: bedside  Patient participation: complete - patient participated  Level of consciousness: awake and alert  Pain management: adequate  Airway patency: patent  Anesthetic complications: No anesthetic complications    Cardiovascular status: acceptable  Respiratory status: acceptable  Hydration status: acceptable  Post Neuraxial Block status: Motor and sensory function returned to baseline and No signs or symptoms of PDPH

## 2020-08-12 VITALS
WEIGHT: 138 LBS | SYSTOLIC BLOOD PRESSURE: 114 MMHG | TEMPERATURE: 98.7 F | OXYGEN SATURATION: 100 % | HEART RATE: 65 BPM | BODY MASS INDEX: 27.09 KG/M2 | DIASTOLIC BLOOD PRESSURE: 74 MMHG | RESPIRATION RATE: 16 BRPM | HEIGHT: 60 IN

## 2020-08-12 RX ORDER — IBUPROFEN 600 MG/1
600 TABLET ORAL EVERY 6 HOURS PRN
Qty: 60 TABLET | Refills: 1 | Status: SHIPPED | OUTPATIENT
Start: 2020-08-12 | End: 2021-04-05

## 2020-08-12 RX ORDER — PSEUDOEPHEDRINE HCL 30 MG
100 TABLET ORAL 2 TIMES DAILY
Qty: 60 EACH | Refills: 1 | Status: SHIPPED | OUTPATIENT
Start: 2020-08-12 | End: 2021-04-05

## 2020-08-12 RX ADMIN — IBUPROFEN 600 MG: 600 TABLET, FILM COATED ORAL at 11:27

## 2020-08-12 RX ADMIN — DOCUSATE SODIUM 100 MG: 100 CAPSULE, LIQUID FILLED ORAL at 08:29

## 2020-08-12 RX ADMIN — IBUPROFEN 600 MG: 600 TABLET, FILM COATED ORAL at 03:46

## 2020-08-12 RX ADMIN — ACETAMINOPHEN 650 MG: 325 TABLET, FILM COATED ORAL at 08:33

## 2020-08-12 NOTE — PAYOR COMM NOTE
"Beto Whittaker (20 y.o. Female)     Auth#[id number]    Discharged home 8/12/2020 with Baby.    From: Dominique Mendosa  #[phone]  Fax#[phone]      Date of Birth Social Security Number Address Home Phone MRN    2000  [address] [facility] [address] [phone] [id number]    [place] Mar[employer] Status          None Single       Admission Date Admission Type Admitting Provider Attending Provider Department, Room/Bed    8/9/20 Elective Freida Ochoa MD  [facility] MOTHER BABY 4A, N408/1    Discharge Date Discharge Disposition Discharge Destination        8/12/2020 Home or Self Care              Attending Provider:  (none)   Allergies:  No Known Allergies    Isolation:  None   Infection:  None   Code Status:  Prior    Ht:  152.4 cm (60\")   Wt:  62.6 kg (138 lb)    Admission Cmt:  None   Principal Problem:  None                Active Insurance as of 8/9/2020     Primary Coverage     Payor Plan Insurance Group Employer/Plan Group    [employer] [facility] [employer] EM[employer]EE [id number]     Payor Plan Address Payor Plan Phone Number Payor Plan Fax Number Effective Dates    [address] [phone]  1/1/2015 - None Entered    [facility] [id number]       Subscriber Name Subscriber Birth Date Member ID       BETO WHITTAKER 2000 [id number]           Secondary Coverage     Payor Plan Insurance Group Employer/Plan Group    [employer] [id number]     Payor Plan Address Payor Plan Phone Number Payor Plan Fax Number Effective Dates    [employer] Claims Office - [address] [address] [phone]  3/1/2020 - None Entered    [facility] [id number]       Subscriber Name Subscriber Birth Date Member ID       BETO WHITTAKER 2000 [id number]                 Emergency Contacts      (Rel.) Home Phone Work Phone Mobile Phone    Bartolome Whittaker (Father) [phone] -- --    Justin Hood (Significant Other) [phone] -- --               Discharge Summary "      Grant Ordaz CNM at 20 0832          [facility]  Vaginal delivery discharge summary      Patient: Beatriz Rai      MR#:[id number]  Admission  Diagnosis:   Patient Active Problem List   Diagnosis   •  (normal spontaneous vaginal delivery)     Discharge Diagnosis:  (normal spontaneous vaginal delivery).      Date of Admission: 2020  Date of Discharge:  2020    Procedures:  Vaginal, Spontaneous     8/10/2020    12:35 PM      Service:  Obstetrics    Hospital Course:  Patient underwent vaginal delivery and remained in the hospital for 3 days.  During that time she remained afebrile and hemodynamically stable.  On the day of discharge, she was eating, ambulating and voiding without difficulty.  She is breastfeeding and supplementing with formula as needed.     Labs:    Lab Results   Component Value Date    WBC 21.62 (H) 2020    HGB 11.2 (L) 2020    HCT 35.2 2020    MCV 86.1 2020     2020    URICACID 5.9 (H) 2020    AST 23 2020    ALT 16 2020     (H) 2020     Results from last 7 days   Lab Units 20  1510   ABO TYPING  AB   RH TYPING  Positive   ANTIBODY SCREEN  Negative       Discharge Medications     Discharge Medications      New Medications      Instructions Start Date   docusate sodium 100 MG capsule   100 mg, Oral, 2 Times Daily      ibuprofen 600 MG tablet  Commonly known as:  ADVIL,MOTRIN   600 mg, Oral, Every 6 Hours PRN         Continue These Medications      Instructions Start Date   fish oil 1000 MG capsule capsule   Oral, Daily With Breakfast      Prenatal 27-1 27-1 MG tablet tablet   1 tablet, Oral, Daily      sertraline 25 MG tablet  Commonly known as:  ZOLOFT   25 mg, Oral, Daily             Discharge Disposition:  To Home    Discharge Condition:  Stable    Discharge Diet: Regular    Activity at Discharge: Pelvic rest    Follow-up Appointments  Follow up with [facility] in 6 weeks.     Grant Ordaz  MEERA  08/12/20  08:32    Electronically signed by Freida Ochoa MD at 08/12/20 0941

## 2020-08-12 NOTE — CONSULTS
Continued Stay Note  [facility]     Patient Name: Beatriz Rai  MRN: [id number]  Today's Date: 8/12/2020    Admit Date: 8/9/2020    Discharge Plan     Row Name 08/12/20 1128       Plan    Plan  MSW available    Plan Comments  Visited pt. Discussed PPD. Pt reports h/o anxiety and being anxious over delivery. Deneia concerns. Provided info on PPD     Final Discharge Disposition Code  01 - home or self-care        Discharge Codes    No documentation.       Expected Discharge Date and Time     Expected Discharge Date Expected Discharge Time    Aug 12, 2020             Crystal Quezada, MSW

## 2020-08-12 NOTE — DISCHARGE SUMMARY
[facility]  Vaginal delivery discharge summary      Patient: Beatriz Rai      MR#:[id number]  Admission  Diagnosis:   Patient Active Problem List   Diagnosis   •  (normal spontaneous vaginal delivery)     Discharge Diagnosis:  (normal spontaneous vaginal delivery).      Date of Admission: 2020  Date of Discharge:  2020    Procedures:  Vaginal, Spontaneous     8/10/2020    12:35 PM      Service:  Obstetrics    Hospital Course:  Patient underwent vaginal delivery and remained in the hospital for 3 days.  During that time she remained afebrile and hemodynamically stable.  On the day of discharge, she was eating, ambulating and voiding without difficulty.  She is breastfeeding and supplementing with formula as needed.     Labs:    Lab Results   Component Value Date    WBC 21.62 (H) 2020    HGB 11.2 (L) 2020    HCT 35.2 2020    MCV 86.1 2020     2020    URICACID 5.9 (H) 2020    AST 23 2020    ALT 16 2020     (H) 2020     Results from last 7 days   Lab Units 20  1510   ABO TYPING  AB   RH TYPING  Positive   ANTIBODY SCREEN  Negative       Discharge Medications     Discharge Medications      New Medications      Instructions Start Date   docusate sodium 100 MG capsule   100 mg, Oral, 2 Times Daily      ibuprofen 600 MG tablet  Commonly known as:  ADVIL,MOTRIN   600 mg, Oral, Every 6 Hours PRN         Continue These Medications      Instructions Start Date   fish oil 1000 MG capsule capsule   Oral, Daily With Breakfast      Prenatal 27-1 27-1 MG tablet tablet   1 tablet, Oral, Daily      sertraline 25 MG tablet  Commonly known as:  ZOLOFT   25 mg, Oral, Daily             Discharge Disposition:  To Home    Discharge Condition:  Stable    Discharge Diet: Regular    Activity at Discharge: Pelvic rest    Follow-up Appointments  Follow up with [facility] in 6 weeks.     Grant Ordaz CNM  20  08:32

## 2020-08-12 NOTE — PROGRESS NOTES
8/12/2020  PPD #2    Subjective   Beatriz feels well.  Patient describes her lochia as less than menses.  Pain is well controlled  She is breastfeeding.      Objective   Temp: Temp:  [97.8 °F (36.6 °C)-98.7 °F (37.1 °C)] 98.7 °F (37.1 °C) Temp src: Oral   BP: BP: (107-114)/(66-74) 114/74        Pulse: Heart Rate:  [65-91] 65  RR: Resp:  [16-18] 16    General:  No acute distress   Abdomen: Fundus firm and beneath umbilicus   Pelvis: deferred     Lab Results   Component Value Date    WBC 21.62 (H) 08/11/2020    HGB 11.2 (L) 08/11/2020    HCT 35.2 08/11/2020    MCV 86.1 08/11/2020     08/11/2020    HEPBSAG Non-Reactive 02/07/2020    AST 23 08/09/2020    URICACID 5.9 (H) 08/09/2020       Assessment  1. PPD# 2 after vaginal delivery    Plan  1. Discharge to home  2. Follow up with LW  in 6 weeks  3. Prescriptions for Motrin and Colace prescribed and advised on weaning.  4. Advised no tampons, intercourse, or tub baths for 6 weeks.       This note has been electronically signed.    Grant Ordaz CNM  08:33  August 12, 2020

## 2020-08-13 ENCOUNTER — HOSPITAL ENCOUNTER (OUTPATIENT)
Dept: LABOR AND DELIVERY | Facility: HOSPITAL | Age: 20
Discharge: HOME | End: 2020-08-13

## 2021-04-05 ENCOUNTER — OFFICE VISIT (OUTPATIENT)
Dept: FAMILY MEDICINE CLINIC | Facility: CLINIC | Age: 21
End: 2021-04-05

## 2021-04-05 VITALS
OXYGEN SATURATION: 100 % | TEMPERATURE: 97 F | HEIGHT: 60 IN | SYSTOLIC BLOOD PRESSURE: 120 MMHG | DIASTOLIC BLOOD PRESSURE: 70 MMHG | HEART RATE: 72 BPM | WEIGHT: 131 LBS | BODY MASS INDEX: 25.72 KG/M2

## 2021-04-05 DIAGNOSIS — F41.1 GAD (GENERALIZED ANXIETY DISORDER): Primary | ICD-10-CM

## 2021-04-05 DIAGNOSIS — N92.6 MISSED MENSES: ICD-10-CM

## 2021-04-05 PROCEDURE — 81025 URINE PREGNANCY TEST: CPT | Performed by: NURSE PRACTITIONER

## 2021-04-05 PROCEDURE — 99214 OFFICE O/P EST MOD 30 MIN: CPT | Performed by: NURSE PRACTITIONER

## 2021-04-05 RX ORDER — HYDROXYZINE HYDROCHLORIDE 25 MG/1
TABLET, FILM COATED ORAL
Qty: 30 TABLET | Refills: 0 | Status: SHIPPED | OUTPATIENT
Start: 2021-04-05 | End: 2021-05-24 | Stop reason: SDUPTHER

## 2021-04-05 RX ORDER — FLUOXETINE HYDROCHLORIDE 20 MG/1
20 CAPSULE ORAL DAILY
Qty: 30 CAPSULE | Refills: 0 | Status: SHIPPED | OUTPATIENT
Start: 2021-04-05 | End: 2021-04-21

## 2021-04-05 NOTE — PROGRESS NOTES
Subjective     Chief Complaint:    Chief Complaint   Patient presents with   • Anxiety     x 1 year       History of Present Illness:   Patient here for anxiety and would like to discuss changing her current medication.  Also needs a referral to a therapist.  States that she has chronic anxiety, even as a child.  Reports that in the past she would finish eating her meal at a restaurant first and would want to go sit in a car because the restaurant was too stimulating.  However, she never had treatment.  Notices that these feelings are returning.  When a panic attack starts, she wiill feel dizzy, nauseated, and like she is going to pass out first, followed by nervousness.  When she was pregnant, she couldn't get in the vehicle due to the anxiety.  She didn't leave the house.  The panic attacks last approx 5 min, and nothing specifically brings her out of it.  A therapist she was seeing advised her to separate herself completely from the situation/environment.  Along with the above symptoms, she also experiences visual disturbances and lightheadedness at times.  Started Zoloft 25 mg 3-4 months after finding out she is pregnant. She feels that the Zoloft isn't effective because of the low dosing, but she is open to trying a different medication.  However, she reports having anxiety with taking medication.  When she started the Zoloft, she would constantly look up the side effects.  No side effects that she is aware of while taking the Zoloft.  Was seeing a therapist through [facility] for 6 months.  Reports it did help, allowed her to be able to ride in a car.  Last therapy session was August 2020.  Is struggling with sitting in class, or going places.  Has noticed that she will rock herself when she is sitting.  Having trouble focusing on her school work, is forgetful.  She failed her first class this semester ([employer] year).  Has trouble remembering to turn in assignments.  Has taken Metoprolol in the past for  tachycardia.  Approx 3 ER trips due to chest pain (Freshman year college).   Now, she knows that if she goes to class, she will get a panic attack, but she will still go, will have dread.  Denies low mood/depression, crying episodes, or unable to get out of bed.  She states that she feels sad that her anxiety is so severe.  Would like to have her anxiety controlled better before she studies abroad in [place] ([place] [facility]    Has noticed that she is having trouble eating x 2 weeks. Reports she is hungry, and when she eats, its difficult for her to swallow.  She feels immediately full, like a rush, similar to the anxiety attacks, that she can't eat anymore.  Denies painful swallowing.  Will eat approx 1 meal a day.  Notices that she feels weaker.  Will have nausea.  Is able to drink fluids,  ounces of water a day.  Hasn't tried protein drinks.  Continues to have BMs, but has noticed multiple BMs, today she reports 4 episodes.  They appear normal, very soft, brown.    Since giving birth in August 2020, her periods are very irregular.  Menstrual cycle returned 3 months post partum.  She reports that each period is later and later. Currently is 15 days late.  Has a 7 day cycle, heavy and then progressively gets lighter. Not currently on birth control. She is not breast feeding.    Reports that she sleeps well.  Goes to bed around 7:30pm, will fall asleep by 10.  Goes to bed at that time because her son is put to sleep at that time.  Wakes up around 7 or 8.  Feels rested.    Review of Systems  Gen- No fevers, chills  CV- No chest pain, palpitations  Resp- No cough, dyspnea  GI- No N/V/D, abd pain  Neuro-No dizziness, headaches      I have reviewed and/or updated the patient's past medical, surgical, family, social history and problem list as appropriate.     Medications:    Current Outpatient Medications:   •  FLUoxetine (PROzac) 20 MG capsule, Take 1 capsule by mouth Daily., Disp: 30 capsule, Rfl: 0  •   "hydrOXYzine (ATARAX) 25 MG tablet, 1/2-1 tablet by mouth 1-2 times per day as needed for anxiety, Disp: 30 tablet, Rfl: 0    Allergies:  No Known Allergies    Objective     Vital Signs:   Vitals:    04/05/21 1530   BP: 120/70   Pulse: 72   Temp: 97 °F (36.1 °C)   SpO2: 100%   Weight: 59.4 kg (131 lb)   Height: 152.4 cm (60\")       Physical Exam:    Physical Exam  Vitals and nursing note reviewed.   Constitutional:       Appearance: Normal appearance. She is normal weight.   HENT:      Head: Normocephalic and atraumatic.      Right Ear: Tympanic membrane, ear canal and external ear normal.      Left Ear: Tympanic membrane, ear canal and external ear normal.      Nose: Nose normal.      Mouth/Throat:      Mouth: Mucous membranes are dry.      Pharynx: Oropharynx is clear. Uvula midline.   Eyes:      Extraocular Movements: Extraocular movements intact.      Conjunctiva/sclera: Conjunctivae normal.      Pupils: Pupils are equal, round, and reactive to light.      Funduscopic exam:     Right eye: Red reflex present.         Left eye: Red reflex present.  Neck:      Vascular: No carotid bruit.      Trachea: Trachea normal.   Cardiovascular:      Rate and Rhythm: Normal rate and regular rhythm.      Pulses: Normal pulses.      Heart sounds: Normal heart sounds.   Pulmonary:      Effort: Pulmonary effort is normal.      Breath sounds: Normal breath sounds and air entry.   Abdominal:      General: Abdomen is flat. Bowel sounds are normal. There is no distension.      Palpations: Abdomen is soft.      Tenderness: There is no abdominal tenderness.   Musculoskeletal:      Cervical back: Neck supple. No tenderness.      Right lower leg: No edema.      Left lower leg: No edema.   Lymphadenopathy:      Head:      Right side of head: No submental, submandibular, tonsillar, preauricular, posterior auricular or occipital adenopathy.      Left side of head: No submental, submandibular, tonsillar, preauricular, posterior auricular or " occipital adenopathy.      Cervical: No cervical adenopathy.   Skin:     General: Skin is warm and dry.      Capillary Refill: Capillary refill takes less than 2 seconds.   Neurological:      General: No focal deficit present.      Mental Status: She is alert and oriented to person, place, and time.   Psychiatric:         Mood and Affect: Mood normal.         Behavior: Behavior normal. Behavior is cooperative.         Assessment / Plan     Assessment/Plan:   Problem List Items Addressed This Visit     None      Visit Diagnoses     HARJEET (generalized anxiety disorder)    -  Primary    Relevant Medications    FLUoxetine (PROzac) 20 MG capsule    hydrOXYzine (ATARAX) 25 MG tablet    Other Relevant Orders    Ambulatory Referral to Behavioral Health    Ambulatory Referral to Behavioral Health    Missed menses        Relevant Orders    POCT pregnancy, urine (Completed)        -Zoloft changed to Prozac per her request.  -Hydroxyzine added as needed to help with panic.  Instructed her to take 1/2-1 tablet when she feels a panic attack starting.  -Food aversion most likely due to mood, will reassess at her follow-up appt.  -Referral given for counseling and psych APRN at her request.  -UPT negative in office    Follow up:  As needed    Scribed for AKIRA Perez by NIKOLAS Root Student     Electronically signed by AKIRA Perez   04/05/2021 15:38 EDT      Please note that portions of this note may have been completed with a voice recognition program. Efforts were made to edit the dictations, but occasionally words are mistranscribed.

## 2021-04-06 ENCOUNTER — IMMUNIZATION (OUTPATIENT)
Dept: VACCINE CLINIC | Facility: HOSPITAL | Age: 21
End: 2021-04-06

## 2021-04-06 PROCEDURE — 91300 HC SARSCOV02 VAC 30MCG/0.3ML IM: CPT | Performed by: INTERNAL MEDICINE

## 2021-04-06 PROCEDURE — 0001A: CPT | Performed by: INTERNAL MEDICINE

## 2021-04-21 ENCOUNTER — OFFICE VISIT (OUTPATIENT)
Dept: BEHAVIORAL HEALTH | Facility: CLINIC | Age: 21
End: 2021-04-21

## 2021-04-21 VITALS
DIASTOLIC BLOOD PRESSURE: 68 MMHG | HEIGHT: 60 IN | SYSTOLIC BLOOD PRESSURE: 116 MMHG | BODY MASS INDEX: 25.72 KG/M2 | WEIGHT: 131 LBS

## 2021-04-21 DIAGNOSIS — F90.2 ATTENTION DEFICIT HYPERACTIVITY DISORDER, COMBINED TYPE: Primary | ICD-10-CM

## 2021-04-21 DIAGNOSIS — F41.1 GENERALIZED ANXIETY DISORDER: ICD-10-CM

## 2021-04-21 PROCEDURE — 90792 PSYCH DIAG EVAL W/MED SRVCS: CPT | Performed by: NURSE PRACTITIONER

## 2021-04-21 RX ORDER — SERTRALINE HYDROCHLORIDE 25 MG/1
25 TABLET, FILM COATED ORAL DAILY
COMMUNITY
End: 2021-04-21

## 2021-04-21 RX ORDER — DEXTROAMPHETAMINE SACCHARATE, AMPHETAMINE ASPARTATE MONOHYDRATE, DEXTROAMPHETAMINE SULFATE AND AMPHETAMINE SULFATE 5; 5; 5; 5 MG/1; MG/1; MG/1; MG/1
20 CAPSULE, EXTENDED RELEASE ORAL EVERY MORNING
Qty: 30 CAPSULE | Refills: 0 | Status: SHIPPED | OUTPATIENT
Start: 2021-04-21 | End: 2021-05-24 | Stop reason: SDUPTHER

## 2021-04-21 NOTE — PROGRESS NOTES
Patient Name: Beatriz Rai  MRN: [id number]   :  2000     Referring Physician: Tasha Hauser APRN    Chief Complaint:     ICD-10-CM ICD-9-CM   1. Attention deficit hyperactivity disorder, combined type  F90.2 314.01   2. Generalized anxiety disorder  F41.1 300.02       HPI:   Beatriz Rai is a 21 y.o. female who is here today for initial evaluation of ADHD and Anxiety   Patient states that her anxiety has been exceptionally bad over the last several months. She worries about school and the issues in her life to the point that she cannot relax. She constantly has muscle tension and can't control the worry. She often feels jittery and irritable, tires easily, is on edge, and can't concentrate. As a result she is having 2-3 panic attacks per week. When these occur she feels like her head is full, she feels disassociated from herself, and feels like she is going to faint. School and social situations are a trigger for them, but often they come out of the blue. She feels as though she makes a lot of careless mistakes, has difficulty paying attention, is easily distracted, loses things, forgetful, avoids tasks that requires sustained mental effort and at times hyper focuses in order to complete them, procrastinates, fails to follow instructions, is fidgety and rocks at times to maintain focus, talks excessively, is impatient and has trouble waiting, has multiple unfinished tasks going on at any given time and interrupts others. She reports that she first noticed these symptoms at around age 7-8. She would become overloaded by sensory stimuli in crowded places as a child and would need to be removed from them to avoid melting down. Her mother is a teacher and she states she had to sit with her at the table all the way through high school in order for her to be able to complete homework and not become distracted. She was in trouble frequently for daydreaming and has always had issues with  focus and concentration. She is currently in college and states that she just failed her first class as a result of these issues. She is making careless mistakes on her assignments, forgetting to go to class, and turning everything in late, even though she is setting multiple alarms and creating reminders for herself. She also feels that she has impulsive behaviors and spends money that she doesn't have at times.    Past Medical History:   Past Medical History:   Diagnosis Date   • Anxiety    • Bacterial vaginosis     REPORTS DIAGNOSED 12-11-18 AND THAT SHE WILL TAKE FLAGYL PER MD INSTRUCTIONS   • Body piercing     NOSE, EARS, BELLY BUTTON   • Closed right ankle fracture 2014    REPORTS INVOLVEMENT OF GROWTH PLATE   • Closed right arm fracture 2006   • Headache    • History of pneumonia    • Tachycardia     REPORTS SHE IS UNDER CARE OF DR GÓMEZ WITH MEDICATION    • Tattoos        Past Surgical History:   Past Surgical History:   Procedure Laterality Date   • ANKLE OPEN REDUCTION INTERNAL FIXATION Right 09/25/2014    MD Jose Rafael   • APPENDECTOMY     • HARDWARE REMOVAL Right 12/14/2018    Procedure: Elective open removal of hardware from right ankle distal fibula;  Surgeon: Stevo Clark MD;  Location: [facility];  Service: Orthopedics       Social History:   Social History     Socioeconomic History   • Marital status: Single     Spouse name: Not on file   • Number of children: Not on file   • Years of education: Not on file   • Highest education level: Not on file   Tobacco Use   • Smoking status: Never Smoker   • Smokeless tobacco: Never Used   Substance and Sexual Activity   • Alcohol use: Not Currently     Comment: REPORTS SOCIAL USE, NO HX OF ABUSE REPORTED   • Drug use: No   • Sexual activity: Defer       Family History:  Family History   Problem Relation Age of Onset   • Coronary artery disease Father    • Diabetes Father    • Hypertension Father    • No Known Problems Mother    • Throat cancer Paternal  Grandfather    • Heart attack Paternal Grandmother    • No Known Problems Maternal Grandmother    • No Known Problems Maternal Grandfather    • Osteoarthritis Other    • Rheumatologic disease Other        Allergy:  No Known Allergies    Current Medications:   Current Outpatient Medications   Medication Sig Dispense Refill   • amphetamine-dextroamphetamine XR (Adderall XR) 20 MG 24 hr capsule Take 1 capsule by mouth Every Morning 30 capsule 0   • hydrOXYzine (ATARAX) 25 MG tablet 1/2-1 tablet by mouth 1-2 times per day as needed for anxiety 30 tablet 0   • sertraline (Zoloft) 50 MG tablet Take 1 tablet by mouth Daily. 30 tablet 2     No current facility-administered medications for this visit.       Lab Results:   Office Visit on 04/05/2021   Component Date Value Ref Range Status   • HCG, Urine, QL 04/05/2021 Negative  Negative Final   • Lot Number 04/05/2021 [id number]   Final   • Internal Positive Control 04/05/2021 Positive   Final   • Internal Negative Control 04/05/2021 Negative   Final       Review of Symptoms:   Review of Systems   Constitutional: Negative for activity change, appetite change, fatigue, unexpected weight gain and unexpected weight loss.   Respiratory: Negative for shortness of breath and wheezing.    Gastrointestinal: Negative for constipation, diarrhea, nausea and vomiting.   Musculoskeletal: Negative for gait problem.   Skin: Negative for dry skin and rash.   Neurological: Negative for dizziness, speech difficulty, weakness, light-headedness, headache, memory problem and confusion.   Psychiatric/Behavioral: Positive for decreased concentration and stress. Negative for agitation, behavioral problems, dysphoric mood, hallucinations, self-injury, sleep disturbance, suicidal ideas, negative for hyperactivity and depressed mood. The patient is nervous/anxious.        Physical Exam:   Physical Exam  Vitals and nursing note reviewed.   Constitutional:       General: She is not in acute distress.      "Appearance: Normal appearance. She is well-developed. She is not diaphoretic.   HENT:      Head: Normocephalic and atraumatic.   Eyes:      Conjunctiva/sclera: Conjunctivae normal.   Cardiovascular:      Rate and Rhythm: Normal rate.   Pulmonary:      Effort: Pulmonary effort is normal. No respiratory distress.   Musculoskeletal:         General: Normal range of motion.      Cervical back: Full passive range of motion without pain and normal range of motion.   Skin:     General: Skin is warm and dry.   Neurological:      Mental Status: She is alert and oriented to person, place, and time.   Psychiatric:         Attention and Perception: Perception normal. She is inattentive.         Mood and Affect: Affect normal. Mood is anxious. Mood is not depressed. Affect is not labile, blunt, angry or inappropriate.         Speech: Speech normal. Speech is not rapid and pressured or tangential.         Behavior: Behavior is hyperactive. Behavior is not agitated, slowed, aggressive, withdrawn or combative. Behavior is cooperative.         Thought Content: Thought content normal. Thought content is not paranoid or delusional. Thought content does not include homicidal or suicidal ideation. Thought content does not include homicidal or suicidal plan.         Cognition and Memory: Cognition normal.         Judgment: Judgment is impulsive.       Blood pressure 116/68, height 152.4 cm (60\"), weight 59.4 kg (131 lb), not currently breastfeeding.  Body mass index is 25.58 kg/m².     Mental Status Exam:   Appearance: appropriate  Hygiene:   good  Cooperation:  Cooperative  Eye Contact:  Good  Psychomotor Behavior:  Restless  Mood:anxious  Affect:  Appropriate  Hopelessness: Denies  Speech:  Normal  Thought Process:  Goal directed  Thought Content:  Normal  Suicidal:  None  Homicidal:  None  Hallucinations:  None  Delusion:  None  Memory:  Intact  Orientation:  Person, Place, Time and Situation  Reliability:  good  Insight:  " Good  Judgement:  Good  Impulse Control:  Good  Physical/Medical Issues:  No     PHQ-9 Depression Screening  Little interest or pleasure in doing things? 1   Feeling down, depressed, or hopeless? 0   Trouble falling or staying asleep, or sleeping too much? 3   Feeling tired or having little energy? 3   Poor appetite or overeating? 3   Feeling bad about yourself - or that you are a failure or have let yourself or your family down? 2   Trouble concentrating on things, such as reading the newspaper or watching television? 2   Moving or speaking so slowly that other people could have noticed? Or the opposite - being so fidgety or restless that you have been moving around a lot more than usual? 3   Thoughts that you would be better off dead, or of hurting yourself in some way? 0   PHQ-9 Total Score 17   If you checked off any problems, how difficult have these problems made it for you to do your work, take care of things at home, or get along with other people?        Assessment/Plan:   Diagnoses and all orders for this visit:    1. Attention deficit hyperactivity disorder, combined type (Primary)  -     amphetamine-dextroamphetamine XR (Adderall XR) 20 MG 24 hr capsule; Take 1 capsule by mouth Every Morning  Dispense: 30 capsule; Refill: 0    2. Generalized anxiety disorder  -     sertraline (Zoloft) 50 MG tablet; Take 1 tablet by mouth Daily.  Dispense: 30 tablet; Refill: 2    Plan: Increase Sertraline to 50mg PO q day, Begin Adderall XR 20mg PO q day, Urine Drug Screen and Controlled Substance Agreement obtained    A psychological evaluation was conducted in order to assess past and current level of functioning. Areas assessed included, but were not limited to: perception of social support, perception of ability to face and deal with challenges in life (positive functioning), anxiety symptoms, depressive symptoms, perspective on beliefs/belief system, coping skills for stress, intelligence level,  Therapeutic rapport  was established. Interventions conducted today were geared towards incorporating medication management along with support for continued therapy. Education was also provided as to the med management with this provider and what to expect in subsequent sessions.    We discussed risks, benefits,goals and side effects of the above medication and the patient was agreeable with the plan.Patient was educated on the importance of compliance with treatment and follow-up appointments. Patient is aware to contact the [facility] with any worsening of symptoms. To call for questions or concerns and return early if necessary. Patent is agreeable to go to the Emergency Department or call 911 should they begin SI/HI.     Treatment Plan:   Discussed risks, benefits, and alternatives of medication. Encouraged healthy habits (eating, exercise and sleep). Call if any questions or problems arise. Medication reconciled. Controlled substance monitoring report reviewed. Provided psychoeducation.. Discussed coping strategies and current stressors. Set appropriate boundaries and limits for patient's well-being. Use distraction techniques to improve symptoms. Access support networks.      Return in about 4 weeks (around 5/19/2021) for Follow up Telephone.    Mirella Mujica, AKIRA  Scribed for Mirella Mujica by Luz Maria Britton, MSN, RN-BC, [facility] PMHNP student

## 2021-04-26 LAB — DRUGS UR: NORMAL

## 2021-05-17 ENCOUNTER — OFFICE VISIT (OUTPATIENT)
Dept: BEHAVIORAL HEALTH | Facility: CLINIC | Age: 21
End: 2021-05-17

## 2021-05-17 VITALS
WEIGHT: 124.6 LBS | SYSTOLIC BLOOD PRESSURE: 110 MMHG | DIASTOLIC BLOOD PRESSURE: 68 MMHG | BODY MASS INDEX: 24.46 KG/M2 | HEIGHT: 60 IN

## 2021-05-17 DIAGNOSIS — F90.2 ATTENTION DEFICIT HYPERACTIVITY DISORDER, COMBINED TYPE: Primary | ICD-10-CM

## 2021-05-17 DIAGNOSIS — F41.1 GENERALIZED ANXIETY DISORDER: ICD-10-CM

## 2021-05-17 PROCEDURE — 90791 PSYCH DIAGNOSTIC EVALUATION: CPT | Performed by: COUNSELOR
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
## 2021-05-24 ENCOUNTER — OFFICE VISIT (OUTPATIENT)
Dept: BEHAVIORAL HEALTH | Facility: CLINIC | Age: 21
End: 2021-05-24

## 2021-05-24 VITALS
WEIGHT: 122 LBS | SYSTOLIC BLOOD PRESSURE: 114 MMHG | HEIGHT: 60 IN | DIASTOLIC BLOOD PRESSURE: 68 MMHG | BODY MASS INDEX: 23.95 KG/M2

## 2021-05-24 DIAGNOSIS — F90.2 ATTENTION DEFICIT HYPERACTIVITY DISORDER, COMBINED TYPE: ICD-10-CM

## 2021-05-24 DIAGNOSIS — F41.1 GAD (GENERALIZED ANXIETY DISORDER): ICD-10-CM

## 2021-05-24 DIAGNOSIS — F41.1 GENERALIZED ANXIETY DISORDER: Primary | ICD-10-CM

## 2021-05-24 PROCEDURE — 99213 OFFICE O/P EST LOW 20 MIN: CPT | Performed by: NURSE PRACTITIONER

## 2021-05-24 RX ORDER — DEXTROAMPHETAMINE SACCHARATE, AMPHETAMINE ASPARTATE MONOHYDRATE, DEXTROAMPHETAMINE SULFATE AND AMPHETAMINE SULFATE 5; 5; 5; 5 MG/1; MG/1; MG/1; MG/1
20 CAPSULE, EXTENDED RELEASE ORAL EVERY MORNING
Qty: 30 CAPSULE | Refills: 0 | Status: SHIPPED | OUTPATIENT
Start: 2021-05-24 | End: 2021-06-21 | Stop reason: SDUPTHER

## 2021-05-24 RX ORDER — HYDROXYZINE HYDROCHLORIDE 25 MG/1
TABLET, FILM COATED ORAL
Qty: 30 TABLET | Refills: 0 | Status: SHIPPED | OUTPATIENT
Start: 2021-05-24 | End: 2021-06-21 | Stop reason: SDUPTHER

## 2021-05-24 NOTE — PROGRESS NOTES
Patient Name: Beatriz Rai  MRN: [id number]   :  2000     Chief Complaint:      ICD-10-CM ICD-9-CM   1. Generalized anxiety disorder  F41.1 300.02   2. Attention deficit hyperactivity disorder, combined type  F90.2 314.01   3. HARJEET (generalized anxiety disorder)  F41.1 300.02       History of Present Illness: Beatriz Rai is a 21 y.o. female is here today for medication management follow up. Patient states things are going much better. She is more organized and is getting assignments completed at school. She is better able to complete her household chores. She feels that her anxiety is less and she is not worrying as much or feeling like a failure.     The following portions of the patient's history were reviewed and updated as appropriate: allergies, current medications, past family history, past medical history, past social history, past surgical history and problem list.    Review of Systems;;  Review of Systems   Constitutional: Negative for activity change, appetite change, fatigue, unexpected weight gain and unexpected weight loss.   Respiratory: Negative for shortness of breath and wheezing.    Gastrointestinal: Negative for constipation, diarrhea, nausea and vomiting.   Musculoskeletal: Negative for gait problem.   Skin: Negative for dry skin and rash.   Neurological: Negative for dizziness, speech difficulty, weakness, light-headedness, headache, memory problem and confusion.   Psychiatric/Behavioral: Negative.  Negative for agitation, behavioral problems, decreased concentration, dysphoric mood, hallucinations, self-injury, sleep disturbance, suicidal ideas, negative for hyperactivity, depressed mood and stress. The patient is not nervous/anxious.        Physical Exam;;  Physical Exam  Vitals and nursing note reviewed.   Constitutional:       General: She is not in acute distress.     Appearance: Normal appearance. She is well-developed. She is not diaphoretic.   HENT:      Head:  "Normocephalic and atraumatic.   Eyes:      Conjunctiva/sclera: Conjunctivae normal.   Cardiovascular:      Rate and Rhythm: Normal rate.   Pulmonary:      Effort: Pulmonary effort is normal. No respiratory distress.   Musculoskeletal:         General: Normal range of motion.      Cervical back: Full passive range of motion without pain and normal range of motion.   Skin:     General: Skin is warm and dry.   Neurological:      Mental Status: She is alert and oriented to person, place, and time.   Psychiatric:         Attention and Perception: Attention and perception normal.         Mood and Affect: Mood and affect normal. Mood is not anxious or depressed. Affect is not labile, blunt, angry or inappropriate.         Speech: Speech normal. Speech is not rapid and pressured or tangential.         Behavior: Behavior normal. Behavior is not agitated, slowed, aggressive, withdrawn, hyperactive or combative. Behavior is cooperative.         Thought Content: Thought content normal. Thought content is not paranoid or delusional. Thought content does not include homicidal or suicidal ideation. Thought content does not include homicidal or suicidal plan.         Cognition and Memory: Cognition normal.         Judgment: Judgment normal.       Blood pressure 114/68, height 152.4 cm (60\"), weight 55.3 kg (122 lb), not currently breastfeeding.  Body mass index is 23.83 kg/m².    Current Medications;;    Current Outpatient Medications:   •  amphetamine-dextroamphetamine XR (Adderall XR) 20 MG 24 hr capsule, Take 1 capsule by mouth Every Morning, Disp: 30 capsule, Rfl: 0  •  hydrOXYzine (ATARAX) 25 MG tablet, 1/2-1 tablet by mouth 1-2 times per day as needed for anxiety, Disp: 30 tablet, Rfl: 0  •  sertraline (Zoloft) 50 MG tablet, Take 1 tablet by mouth Daily., Disp: 30 tablet, Rfl: 2    Lab Results:   Office Visit on 04/21/2021   Component Date Value Ref Range Status   • Report Summary 04/21/2021 FINAL   Final    Comment: " ====================================================================  [facility] DRUG ANALYSIS,[facility]  ====================================================================  Test                             Result       Flag       Units  Drug Present    Carboxy-THC                    449                     ng/mg creat     Carboxy-THC is a metabolite of tetrahydrocannabinol (THC). Source     of THC is most commonly herbal marijuana or marijuana-based     products, but THC is also present in a scheduled prescription     medication. Trace amounts of THC can be present in hemp and     cannabidiol (CBD) products. This test is not intended to     distinguish between delta-9-tetrahydrocannabinol, the predominant     form of THC in most herbal or marijuana-based products, and     delta-8-tetrahydrocannabinol.    Sertraline                     PRESENT    Desmethylsertraline            PRESENT     Desmethylsertraline is an expected metabolite of sertraline.  =====================================                           ===============================  Test                      Result    Flag   Units      Ref Range    Creatinine              75               mg/dL      >=20  ====================================================================  Declared Medications:   Medication list was not provided.  ====================================================================  For clinical consultation, please call [phone].  ====================================================================     Office Visit on 04/05/2021   Component Date Value Ref Range Status   • HCG, Urine, QL 04/05/2021 Negative  Negative Final   • Lot Number 04/05/2021 [id number]   Final   • Internal Positive Control 04/05/2021 Positive   Final   • Internal Negative Control 04/05/2021 Negative   Final       Mental Status Exam:   Hygiene:   good  Cooperation:  Cooperative  Eye Contact:  Good  Psychomotor Behavior:  Appropriate  Mood:within normal  limits  Affect:  Appropriate  Hopelessness: Denies  Speech:  Normal  Thought Process:  Goal directed  Thought Content:  Normal  Suicidal:  None  Homicidal:  None  Hallucinations:  None  Delusion:  None  Memory:  Intact  Orientation:  Person, Place, Time and Situation  Reliability:  good  Insight:  Good  Judgement:  Good  Impulse Control:  Good  Physical/Medical Issues:  No     PHQ-9 Depression Screening  Little interest or pleasure in doing things? 2   Feeling down, depressed, or hopeless? 0   Trouble falling or staying asleep, or sleeping too much? 0   Feeling tired or having little energy? 0   Poor appetite or overeating? 0   Feeling bad about yourself - or that you are a failure or have let yourself or your family down? 0   Trouble concentrating on things, such as reading the newspaper or watching television? 0   Moving or speaking so slowly that other people could have noticed? Or the opposite - being so fidgety or restless that you have been moving around a lot more than usual? 0   Thoughts that you would be better off dead, or of hurting yourself in some way? 0   PHQ-9 Total Score 2   If you checked off any problems, how difficult have these problems made it for you to do your work, take care of things at home, or get along with other people?          Assessment/Plan:  Diagnoses and all orders for this visit:    1. Generalized anxiety disorder (Primary)  -     sertraline (Zoloft) 50 MG tablet; Take 1 tablet by mouth Daily.  Dispense: 30 tablet; Refill: 2    2. Attention deficit hyperactivity disorder, combined type  -     amphetamine-dextroamphetamine XR (Adderall XR) 20 MG 24 hr capsule; Take 1 capsule by mouth Every Morning  Dispense: 30 capsule; Refill: 0    3. HARJEET (generalized anxiety disorder)  -     hydrOXYzine (ATARAX) 25 MG tablet; 1/2-1 tablet by mouth 1-2 times per day as needed for anxiety  Dispense: 30 tablet; Refill: 0      Plan: Continue current medications    A psychological evaluation was  conducted in order to assess past and current level of functioning. Areas assessed included, but were not limited to: perception of social support, perception of ability to face and deal with challenges in life (positive functioning), anxiety symptoms, depressive symptoms, perspective on beliefs/belief system, coping skills for stress, intelligence level,  Therapeutic rapport was established. Interventions conducted today were geared towards incorporating medication management along with support for continued therapy. Education was also provided as to the med management with this provider and what to expect in subsequent sessions.    We discussed risks, benefits,goals and side effects of the above medication and the patient was agreeable with the plan.Patient was educated on the importance of compliance with treatment and follow-up appointments. Patient is aware to contact the [facility] with any worsening of symptoms. To call for questions or concerns and return early if necessary. Patent is agreeable to go to the Emergency Department or call 911 should they begin SI/HI.     Treatment Plan:   Discussed risks, benefits, and alternatives of medication. Encouraged healthy habits (eating, exercise and sleep). Call if any questions or problems arise. Medication reconciled. Controlled substance monitoring report reviewed. Provided psychoeducation.. Discussed coping strategies and current stressors. Set appropriate boundaries and limits for patient's well-being. Use distraction techniques to improve symptoms. Access support networks.      Return in about 2 months (around 7/24/2021).    AKIRA Olvera  Scribed for Mirella Mujica by Luz Maria Britton, MSN, RN-BC, [facility]

## 2021-06-21 DIAGNOSIS — F41.1 GAD (GENERALIZED ANXIETY DISORDER): ICD-10-CM

## 2021-06-21 DIAGNOSIS — F90.2 ATTENTION DEFICIT HYPERACTIVITY DISORDER, COMBINED TYPE: ICD-10-CM

## 2021-06-21 RX ORDER — HYDROXYZINE HYDROCHLORIDE 25 MG/1
TABLET, FILM COATED ORAL
Qty: 30 TABLET | Refills: 0 | Status: SHIPPED | OUTPATIENT
Start: 2021-06-21 | End: 2021-07-22 | Stop reason: SDUPTHER

## 2021-06-21 RX ORDER — DEXTROAMPHETAMINE SACCHARATE, AMPHETAMINE ASPARTATE MONOHYDRATE, DEXTROAMPHETAMINE SULFATE AND AMPHETAMINE SULFATE 5; 5; 5; 5 MG/1; MG/1; MG/1; MG/1
20 CAPSULE, EXTENDED RELEASE ORAL EVERY MORNING
Qty: 30 CAPSULE | Refills: 0 | Status: SHIPPED | OUTPATIENT
Start: 2021-06-21 | End: 2021-07-22 | Stop reason: SDUPTHER

## 2021-06-21 NOTE — TELEPHONE ENCOUNTER
PT ALSO ASKED IF WE CAN FAX A LETTER TO HER SCHOOL AT [facility] STATING SHE HAS ADHD.    FAX: [phone]  ATTENTION: CSA    PLEASE ADVISE

## 2021-06-21 NOTE — TELEPHONE ENCOUNTER
REFILLS:    ADDERALL XR 20 MG  ATARAX 25 MG  ZOLOFT 50 MG      [facility]

## 2021-06-30 ENCOUNTER — OFFICE VISIT (OUTPATIENT)
Dept: BEHAVIORAL HEALTH | Facility: CLINIC | Age: 21
End: 2021-06-30

## 2021-06-30 VITALS
DIASTOLIC BLOOD PRESSURE: 70 MMHG | WEIGHT: 119.4 LBS | SYSTOLIC BLOOD PRESSURE: 110 MMHG | BODY MASS INDEX: 23.44 KG/M2 | HEIGHT: 60 IN

## 2021-06-30 DIAGNOSIS — F33.0 MILD EPISODE OF RECURRENT MAJOR DEPRESSIVE DISORDER (HCC): Primary | ICD-10-CM

## 2021-06-30 PROCEDURE — 90832 PSYTX W PT 30 MINUTES: CPT | Performed by: COUNSELOR
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
## 2021-07-22 DIAGNOSIS — F90.2 ATTENTION DEFICIT HYPERACTIVITY DISORDER, COMBINED TYPE: ICD-10-CM

## 2021-07-22 DIAGNOSIS — F41.1 GENERALIZED ANXIETY DISORDER: ICD-10-CM

## 2021-07-22 DIAGNOSIS — F41.1 GAD (GENERALIZED ANXIETY DISORDER): ICD-10-CM

## 2021-07-22 RX ORDER — HYDROXYZINE HYDROCHLORIDE 25 MG/1
TABLET, FILM COATED ORAL
Qty: 30 TABLET | Refills: 3 | Status: SHIPPED | OUTPATIENT
Start: 2021-07-22 | End: 2021-08-20 | Stop reason: SDUPTHER

## 2021-07-22 RX ORDER — DEXTROAMPHETAMINE SACCHARATE, AMPHETAMINE ASPARTATE MONOHYDRATE, DEXTROAMPHETAMINE SULFATE AND AMPHETAMINE SULFATE 5; 5; 5; 5 MG/1; MG/1; MG/1; MG/1
20 CAPSULE, EXTENDED RELEASE ORAL EVERY MORNING
Qty: 30 CAPSULE | Refills: 0 | Status: SHIPPED | OUTPATIENT
Start: 2021-07-22 | End: 2021-07-26 | Stop reason: SDUPTHER

## 2021-07-22 NOTE — TELEPHONE ENCOUNTER
Patients said she ran out of meds and asked to please send in asap. She starts a new job tomorrow & needs them.

## 2021-07-23 DIAGNOSIS — F41.1 GAD (GENERALIZED ANXIETY DISORDER): ICD-10-CM

## 2021-07-23 DIAGNOSIS — F41.1 GENERALIZED ANXIETY DISORDER: ICD-10-CM

## 2021-07-23 DIAGNOSIS — F90.2 ATTENTION DEFICIT HYPERACTIVITY DISORDER, COMBINED TYPE: ICD-10-CM

## 2021-07-26 ENCOUNTER — OFFICE VISIT (OUTPATIENT)
Dept: BEHAVIORAL HEALTH | Facility: CLINIC | Age: 21
End: 2021-07-26

## 2021-07-26 VITALS
HEIGHT: 60 IN | WEIGHT: 113.6 LBS | BODY MASS INDEX: 22.3 KG/M2 | DIASTOLIC BLOOD PRESSURE: 62 MMHG | SYSTOLIC BLOOD PRESSURE: 112 MMHG

## 2021-07-26 DIAGNOSIS — E53.8 B12 DEFICIENCY: ICD-10-CM

## 2021-07-26 DIAGNOSIS — F41.1 GAD (GENERALIZED ANXIETY DISORDER): ICD-10-CM

## 2021-07-26 DIAGNOSIS — F90.2 ATTENTION DEFICIT HYPERACTIVITY DISORDER, COMBINED TYPE: Primary | ICD-10-CM

## 2021-07-26 DIAGNOSIS — E61.1 IRON DEFICIENCY: ICD-10-CM

## 2021-07-26 DIAGNOSIS — E55.9 VITAMIN D DEFICIENCY: ICD-10-CM

## 2021-07-26 DIAGNOSIS — F33.0 MILD EPISODE OF RECURRENT MAJOR DEPRESSIVE DISORDER (HCC): ICD-10-CM

## 2021-07-26 PROCEDURE — 99213 OFFICE O/P EST LOW 20 MIN: CPT | Performed by: NURSE PRACTITIONER

## 2021-07-26 RX ORDER — DEXTROAMPHETAMINE SACCHARATE, AMPHETAMINE ASPARTATE MONOHYDRATE, DEXTROAMPHETAMINE SULFATE AND AMPHETAMINE SULFATE 5; 5; 5; 5 MG/1; MG/1; MG/1; MG/1
20 CAPSULE, EXTENDED RELEASE ORAL EVERY MORNING
Qty: 30 CAPSULE | Refills: 0 | OUTPATIENT
Start: 2021-07-26

## 2021-07-26 RX ORDER — DEXTROAMPHETAMINE SACCHARATE, AMPHETAMINE ASPARTATE MONOHYDRATE, DEXTROAMPHETAMINE SULFATE AND AMPHETAMINE SULFATE 5; 5; 5; 5 MG/1; MG/1; MG/1; MG/1
20 CAPSULE, EXTENDED RELEASE ORAL EVERY MORNING
Qty: 30 CAPSULE | Refills: 0 | Status: SHIPPED | OUTPATIENT
Start: 2021-07-26 | End: 2021-08-20 | Stop reason: SDUPTHER

## 2021-07-26 RX ORDER — HYDROXYZINE HYDROCHLORIDE 25 MG/1
TABLET, FILM COATED ORAL
Qty: 30 TABLET | Refills: 3 | OUTPATIENT
Start: 2021-07-26

## 2021-07-26 RX ORDER — FLUOXETINE HYDROCHLORIDE 20 MG/1
20 CAPSULE ORAL DAILY
Qty: 30 CAPSULE | Refills: 2 | Status: SHIPPED | OUTPATIENT
Start: 2021-07-26 | End: 2021-08-27 | Stop reason: SDUPTHER

## 2021-07-26 RX ORDER — DEXTROAMPHETAMINE SACCHARATE, AMPHETAMINE ASPARTATE, DEXTROAMPHETAMINE SULFATE AND AMPHETAMINE SULFATE 2.5; 2.5; 2.5; 2.5 MG/1; MG/1; MG/1; MG/1
TABLET ORAL
Qty: 30 TABLET | Refills: 0 | Status: SHIPPED | OUTPATIENT
Start: 2021-07-26 | End: 2021-08-28 | Stop reason: SDUPTHER

## 2021-08-09 ENCOUNTER — LAB (OUTPATIENT)
Dept: FAMILY MEDICINE CLINIC | Facility: CLINIC | Age: 21
End: 2021-08-09

## 2021-08-10 LAB
ALBUMIN SERPL-MCNC: 4.6 G/DL (ref 3.5–5.2)
ALBUMIN/GLOB SERPL: 1.8 G/DL
ALP SERPL-CCNC: 75 U/L (ref 39–117)
ALT SERPL-CCNC: 11 U/L (ref 1–33)
AST SERPL-CCNC: 17 U/L (ref 1–32)
BILIRUB SERPL-MCNC: 0.2 MG/DL (ref 0–1.2)
BUN SERPL-MCNC: 8 MG/DL (ref 6–20)
BUN/CREAT SERPL: 10.3 (ref 7–25)
CALCIUM SERPL-MCNC: 9.7 MG/DL (ref 8.6–10.5)
CHLORIDE SERPL-SCNC: 105 MMOL/L (ref 98–107)
CO2 SERPL-SCNC: 22.8 MMOL/L (ref 22–29)
CREAT SERPL-MCNC: 0.78 MG/DL (ref 0.57–1)
FT4I SERPL CALC-MCNC: 1.6 (ref 1.2–4.9)
GLOBULIN SER CALC-MCNC: 2.6 GM/DL
GLUCOSE SERPL-MCNC: 85 MG/DL (ref 65–99)
POTASSIUM SERPL-SCNC: 4 MMOL/L (ref 3.5–5.2)
PROT SERPL-MCNC: 7.2 G/DL (ref 6–8.5)
SODIUM SERPL-SCNC: 141 MMOL/L (ref 136–145)
T3RU NFR SERPL: 25 % (ref 24–39)
T4 SERPL-MCNC: 6.4 UG/DL (ref 4.5–12)
TSH SERPL DL<=0.005 MIU/L-ACNC: 2.94 UIU/ML (ref 0.45–4.5)

## 2021-08-20 DIAGNOSIS — F41.1 GAD (GENERALIZED ANXIETY DISORDER): ICD-10-CM

## 2021-08-20 DIAGNOSIS — F90.2 ATTENTION DEFICIT HYPERACTIVITY DISORDER, COMBINED TYPE: ICD-10-CM

## 2021-08-23 RX ORDER — HYDROXYZINE HYDROCHLORIDE 25 MG/1
TABLET, FILM COATED ORAL
Qty: 30 TABLET | Refills: 3 | Status: SHIPPED | OUTPATIENT
Start: 2021-08-23 | End: 2021-09-29 | Stop reason: SDUPTHER

## 2021-08-23 RX ORDER — DEXTROAMPHETAMINE SACCHARATE, AMPHETAMINE ASPARTATE MONOHYDRATE, DEXTROAMPHETAMINE SULFATE AND AMPHETAMINE SULFATE 5; 5; 5; 5 MG/1; MG/1; MG/1; MG/1
20 CAPSULE, EXTENDED RELEASE ORAL EVERY MORNING
Qty: 30 CAPSULE | Refills: 0 | Status: SHIPPED | OUTPATIENT
Start: 2021-08-23 | End: 2021-08-27 | Stop reason: SDUPTHER

## 2021-08-27 DIAGNOSIS — F41.1 GAD (GENERALIZED ANXIETY DISORDER): ICD-10-CM

## 2021-08-27 DIAGNOSIS — F33.0 MILD EPISODE OF RECURRENT MAJOR DEPRESSIVE DISORDER (HCC): ICD-10-CM

## 2021-08-27 DIAGNOSIS — F90.2 ATTENTION DEFICIT HYPERACTIVITY DISORDER, COMBINED TYPE: ICD-10-CM

## 2021-08-27 RX ORDER — DEXTROAMPHETAMINE SACCHARATE, AMPHETAMINE ASPARTATE MONOHYDRATE, DEXTROAMPHETAMINE SULFATE AND AMPHETAMINE SULFATE 5; 5; 5; 5 MG/1; MG/1; MG/1; MG/1
20 CAPSULE, EXTENDED RELEASE ORAL EVERY MORNING
Qty: 30 CAPSULE | Refills: 0 | Status: SHIPPED | OUTPATIENT
Start: 2021-08-27 | End: 2021-09-17 | Stop reason: SDUPTHER

## 2021-08-27 RX ORDER — FLUOXETINE HYDROCHLORIDE 20 MG/1
20 CAPSULE ORAL DAILY
Qty: 30 CAPSULE | Refills: 2 | Status: SHIPPED | OUTPATIENT
Start: 2021-08-27 | End: 2021-09-29 | Stop reason: SDUPTHER

## 2021-08-27 NOTE — TELEPHONE ENCOUNTER
REFILL:    ADDERALL 20 MG  PROZAC 20 MG     [facility]     LOV-  7/26 NOV-9/27

## 2021-08-28 DIAGNOSIS — F90.2 ATTENTION DEFICIT HYPERACTIVITY DISORDER, COMBINED TYPE: ICD-10-CM

## 2021-08-30 RX ORDER — DEXTROAMPHETAMINE SACCHARATE, AMPHETAMINE ASPARTATE, DEXTROAMPHETAMINE SULFATE AND AMPHETAMINE SULFATE 2.5; 2.5; 2.5; 2.5 MG/1; MG/1; MG/1; MG/1
TABLET ORAL
Qty: 30 TABLET | Refills: 0 | Status: SHIPPED | OUTPATIENT
Start: 2021-08-30 | End: 2021-09-07 | Stop reason: SDUPTHER

## 2021-09-01 ENCOUNTER — TELEPHONE (OUTPATIENT)
Dept: FAMILY MEDICINE CLINIC | Facility: CLINIC | Age: 21
End: 2021-09-01

## 2021-09-01 ENCOUNTER — PRIOR AUTHORIZATION (OUTPATIENT)
Dept: FAMILY MEDICINE CLINIC | Facility: CLINIC | Age: 21
End: 2021-09-01

## 2021-09-01 DIAGNOSIS — F90.2 ATTENTION DEFICIT HYPERACTIVITY DISORDER, COMBINED TYPE: ICD-10-CM

## 2021-09-01 NOTE — TELEPHONE ENCOUNTER
Patient called and said the Adderall 10mg is not covered. She doesn't know if it needs a PA, or maybe switched to a lower/different dose or even different medication.

## 2021-09-01 NOTE — TELEPHONE ENCOUNTER
A PRIOR AUTH HAS BEEN STARTED THROUGH COVER MY MEDS FOR ADDERALL.    CURRENTLY WAITING ON A RESPONSE FROM THE INSURANCE.

## 2021-09-07 RX ORDER — DEXTROAMPHETAMINE SACCHARATE, AMPHETAMINE ASPARTATE, DEXTROAMPHETAMINE SULFATE AND AMPHETAMINE SULFATE 2.5; 2.5; 2.5; 2.5 MG/1; MG/1; MG/1; MG/1
TABLET ORAL
Qty: 30 TABLET | Refills: 0 | Status: SHIPPED | OUTPATIENT
Start: 2021-09-07 | End: 2021-09-17 | Stop reason: SDUPTHER

## 2021-09-07 NOTE — TELEPHONE ENCOUNTER
MEDICATION WAS DENIED.      Date: 09/02/2021  ZAINA RAMIREZ  [address] [place] [address]  Plan Member Name: BETO WHITTAKER  Plan Member ID:[id number]  Plan Name: [facility]  Prescriber Name: ZAINA RAMIREZ  Prescriber Phone: [phone]  Prescriber Fax: [phone]  Dear BETO WHITTAKER:  [employer] received a request for coverage of Adderall 10MG OR TABS for you. This  is the initial adverse determination for this request. The request was denied because:  You do not meet the requirements of your plan. Your plan approved criteria covers this  drug when you had an adverse reaction to the generic drug due to an inactive ingredient.  Your request has been denied based on the information we have. Examples (may vary  based on your plan) of alternatives to the requested drug are: amphetaminedextroamphetamine, amphetamine-dextroamphetamine extended-release, atomoxetine,  dexmethylphenidate, dexmethylphenidate extended-release, dextroamphetamine,  dextroamphetamine extended-release, guanfacine extended-release, methylphenidate,  methylphenidate extended-release. Your prescriber will be responsible for determining  what alternative is appropriate for you. This list may not include all alternatives covered by  your plan and may include alternatives that require prior authorization. Please refer to  your plan documents for a complete list of alternatives. Requirement: you must have tried  3 alternatives if there are 3 or more covered alternatives, 2 alternatives if there are 2  covered alternatives, or 1 alternative if there is only 1 covered alternative.  If you need assistance determining what alternative benefits, services or supplies may be  covered under your plan, please call the toll-free Customer Care number on your benefit  ID card or you may refer to your benefit plan materials.  You may ask for a free copy of the actual benefit provision, guideline, protocol or other  similar criterion  used to make the decision and any other information related to this  decision by calling Customer Care toll-free at the number on your benefit ID card.  You may choose to purchase this medicine at your own expense. For more information  regarding your prescription benefit, please refer to your benefit plan materials.  This document contains references to brand-name prescription drugs that are trademarks or registered trademarks of pharmaceutical  manufacturers not affiliated with [facility].  Your privacy is important to us. Our employees are trained regarding the appropriate way to handle your private health information.  [id number] TDD/TTY: [phone]  If you disagree with this decision, you may ask for an appeal. Please mail or fax your  appeal to:  Prescription Claim Appeals  [address] - [facility]  [address] [address]  [place] [address]  Fax: [phone]  If your situation is urgent as defined by law you may request an expedited appeal. Urgent  requests must be clearly identified as “urgent” when submitted.  Important information about your additional rights and directions how to ask for an appeal  are provided with this letter.  If your prescriber would like to discuss this decision with a clinical reviewer at [facility], your prescriber can call [facility], and we will arrange to make someone  available to speak with your prescriber.  If you belong to a group plan that is subject to the Employee [employer] Income Security  Act of 1974 (ERISA), you may also have the right to bring a civil action under ERISA  Section 502(a). If your plan has informed us of its time limit for bringing a civil action under  ERISA, the time limit is listed below. If nothing is listed, please refer to your benefit plan  materials or contact your plan  for more information on time limits for bringing  a civil action.  If you have questions, please call Customer Care toll-free at the number on your  benefit  ID card or in your benefit plan materials.  You may also contact the [place] Department of Insurance with questions by writing or  calling:  [facility] [employer] of Insurance  Consumer Protection Division  [address]  [place] [address]  Phone: [phone] (option 1) or [phone]  Fax: [phone]  Web: http://[place]urance.[place].gov  Sincerely,  Hemant Calvo MD Medical Director [place] #1656, [place] #[id number], [place] #[id number],  Reviewing/Determining Physician  [facility]

## 2021-09-07 NOTE — TELEPHONE ENCOUNTER
Have you seen anything come through on this? There is no KEY code in message so I can't look it up myself.

## 2021-09-17 DIAGNOSIS — F90.2 ATTENTION DEFICIT HYPERACTIVITY DISORDER, COMBINED TYPE: ICD-10-CM

## 2021-09-20 RX ORDER — DEXTROAMPHETAMINE SACCHARATE, AMPHETAMINE ASPARTATE, DEXTROAMPHETAMINE SULFATE AND AMPHETAMINE SULFATE 2.5; 2.5; 2.5; 2.5 MG/1; MG/1; MG/1; MG/1
TABLET ORAL
Qty: 30 TABLET | Refills: 0 | Status: SHIPPED | OUTPATIENT
Start: 2021-09-20 | End: 2021-09-29 | Stop reason: SDUPTHER

## 2021-09-20 RX ORDER — DEXTROAMPHETAMINE SACCHARATE, AMPHETAMINE ASPARTATE MONOHYDRATE, DEXTROAMPHETAMINE SULFATE AND AMPHETAMINE SULFATE 5; 5; 5; 5 MG/1; MG/1; MG/1; MG/1
20 CAPSULE, EXTENDED RELEASE ORAL EVERY MORNING
Qty: 30 CAPSULE | Refills: 0 | Status: SHIPPED | OUTPATIENT
Start: 2021-09-20 | End: 2021-10-11 | Stop reason: SDUPTHER

## 2021-09-27 ENCOUNTER — OFFICE VISIT (OUTPATIENT)
Dept: BEHAVIORAL HEALTH | Facility: CLINIC | Age: 21
End: 2021-09-27

## 2021-09-27 VITALS — BODY MASS INDEX: 20.62 KG/M2 | WEIGHT: 105 LBS | HEIGHT: 60 IN

## 2021-09-27 DIAGNOSIS — F33.0 MILD EPISODE OF RECURRENT MAJOR DEPRESSIVE DISORDER (HCC): ICD-10-CM

## 2021-09-27 DIAGNOSIS — F90.2 ATTENTION DEFICIT HYPERACTIVITY DISORDER, COMBINED TYPE: Primary | ICD-10-CM

## 2021-09-27 PROCEDURE — 90834 PSYTX W PT 45 MINUTES: CPT | Performed by: COUNSELOR

## 2021-09-29 ENCOUNTER — TELEMEDICINE (OUTPATIENT)
Dept: BEHAVIORAL HEALTH | Facility: CLINIC | Age: 21
End: 2021-09-29

## 2021-09-29 DIAGNOSIS — F33.0 MILD EPISODE OF RECURRENT MAJOR DEPRESSIVE DISORDER (HCC): ICD-10-CM

## 2021-09-29 DIAGNOSIS — F41.1 GAD (GENERALIZED ANXIETY DISORDER): ICD-10-CM

## 2021-09-29 DIAGNOSIS — F90.2 ATTENTION DEFICIT HYPERACTIVITY DISORDER, COMBINED TYPE: Primary | ICD-10-CM

## 2021-09-29 PROCEDURE — 99213 OFFICE O/P EST LOW 20 MIN: CPT | Performed by: NURSE PRACTITIONER

## 2021-09-29 RX ORDER — HYDROXYZINE HYDROCHLORIDE 25 MG/1
TABLET, FILM COATED ORAL
Qty: 30 TABLET | Refills: 6 | Status: SHIPPED | OUTPATIENT
Start: 2021-09-29 | End: 2021-12-22 | Stop reason: SDUPTHER

## 2021-09-29 RX ORDER — DEXTROAMPHETAMINE SACCHARATE, AMPHETAMINE ASPARTATE, DEXTROAMPHETAMINE SULFATE AND AMPHETAMINE SULFATE 2.5; 2.5; 2.5; 2.5 MG/1; MG/1; MG/1; MG/1
TABLET ORAL
Qty: 30 TABLET | Refills: 0 | Status: SHIPPED | OUTPATIENT
Start: 2021-09-29 | End: 2021-10-11 | Stop reason: SDUPTHER

## 2021-09-29 RX ORDER — FLUOXETINE HYDROCHLORIDE 20 MG/1
20 CAPSULE ORAL DAILY
Qty: 30 CAPSULE | Refills: 6 | Status: SHIPPED | OUTPATIENT
Start: 2021-09-29 | End: 2022-02-23 | Stop reason: DRUGHIGH

## 2021-09-29 NOTE — PROGRESS NOTES
Patient Name: Beatriz Rai  MRN: [id number]   :  2000     Chief Complaint:      ICD-10-CM ICD-9-CM   1. Attention deficit hyperactivity disorder, combined type  F90.2 314.01   2. HARJEET (generalized anxiety disorder)  F41.1 300.02   3. Mild episode of recurrent major depressive disorder (HCC)  F33.0 296.31       History of Present Illness: Beatriz Rai is a 21 y.o. female is here today for medication management follow up.  Patient has not been able to take the 10 mg very often of Adderall as there is insurance issues with getting them to cover it.  Patient feels her Prozac and hydroxyzine are working well for anxiety and depression.    The following portions of the patient's history were reviewed and updated as appropriate: allergies, current medications, past family history, past medical history, past social history, past surgical history and problem list.    Review of Systems;;  Review of Systems   Constitutional: Positive for appetite change and unexpected weight loss. Negative for activity change, fatigue and unexpected weight gain.   Respiratory: Negative for shortness of breath and wheezing.    Gastrointestinal: Negative for constipation, diarrhea, nausea and vomiting.   Musculoskeletal: Negative for gait problem.   Skin: Negative for dry skin and rash.   Neurological: Negative for dizziness, speech difficulty, weakness, light-headedness, headache, memory problem and confusion.   Psychiatric/Behavioral: Positive for decreased concentration and positive for hyperactivity. Negative for agitation, behavioral problems, dysphoric mood, hallucinations, self-injury, sleep disturbance, suicidal ideas, depressed mood and stress. The patient is not nervous/anxious.        Physical Exam;;  Physical Exam  Vitals and nursing note reviewed.   Constitutional:       General: She is not in acute distress.     Appearance: She is well-developed. She is not diaphoretic.   HENT:      Head: Normocephalic and  atraumatic.   Eyes:      Conjunctiva/sclera: Conjunctivae normal.   Cardiovascular:      Rate and Rhythm: Normal rate.   Pulmonary:      Effort: Pulmonary effort is normal. No respiratory distress.   Musculoskeletal:         General: Normal range of motion.      Cervical back: Full passive range of motion without pain and normal range of motion.   Skin:     General: Skin is warm and dry.   Neurological:      Mental Status: She is alert and oriented to person, place, and time.   Psychiatric:         Mood and Affect: Mood is not anxious or depressed. Affect is not labile, blunt, angry or inappropriate.         Speech: Speech is not rapid and pressured or tangential.         Behavior: Behavior normal. Behavior is not agitated, slowed, aggressive, withdrawn, hyperactive or combative. Behavior is cooperative.         Thought Content: Thought content normal. Thought content is not paranoid or delusional. Thought content does not include homicidal or suicidal ideation. Thought content does not include homicidal or suicidal plan.         Judgment: Judgment normal.       unknown if currently breastfeeding.  There is no height or weight on file to calculate BMI.    Current Medications;;    Current Outpatient Medications:   •  amphetamine-dextroamphetamine (Adderall) 10 MG tablet, Take 10 mg orally every afternoon., Disp: 30 tablet, Rfl: 0  •  amphetamine-dextroamphetamine XR (Adderall XR) 20 MG 24 hr capsule, Take 1 capsule by mouth Every Morning, Disp: 30 capsule, Rfl: 0  •  FLUoxetine (PROzac) 20 MG capsule, Take 1 capsule by mouth Daily., Disp: 30 capsule, Rfl: 6  •  hydrOXYzine (ATARAX) 25 MG tablet, 1/2-1 tablet by mouth 1-2 times per day as needed for anxiety, Disp: 30 tablet, Rfl: 6    Lab Results:   Results Encounter on 07/31/2021   Component Date Value Ref Range Status   • WBC 08/09/2021 6.71  3.40 - 10.80 10*3/mm3 Final   • RBC 08/09/2021 5.18  3.77 - 5.28 10*6/mm3 Final   • Hemoglobin 08/09/2021 15.4  12.0 - 15.9  g/dL Final   • Hematocrit 08/09/2021 46.8* 34.0 - 46.6 % Final   • MCV 08/09/2021 90.3  79.0 - 97.0 fL Final   • MCH 08/09/2021 29.7  26.6 - 33.0 pg Final   • MCHC 08/09/2021 32.9  31.5 - 35.7 g/dL Final   • RDW 08/09/2021 12.8  12.3 - 15.4 % Final   • Platelets 08/09/2021 347  140 - 450 10*3/mm3 Final   • Neutrophil Rel % 08/09/2021 53.2  42.7 - 76.0 % Final   • Lymphocyte Rel % 08/09/2021 36.2  19.6 - 45.3 % Final   • Monocyte Rel % 08/09/2021 6.6  5.0 - 12.0 % Final   • Eosinophil Rel % 08/09/2021 2.8  0.3 - 6.2 % Final   • Basophil Rel % 08/09/2021 0.9  0.0 - 1.5 % Final   • Neutrophils Absolute 08/09/2021 3.57  1.70 - 7.00 10*3/mm3 Final   • Lymphocytes Absolute 08/09/2021 2.43  0.70 - 3.10 10*3/mm3 Final   • Monocytes Absolute 08/09/2021 0.44  0.10 - 0.90 10*3/mm3 Final   • Eosinophils Absolute 08/09/2021 0.19  0.00 - 0.40 10*3/mm3 Final   • Basophils Absolute 08/09/2021 0.06  0.00 - 0.20 10*3/mm3 Final   • Immature Granulocyte Rel % 08/09/2021 0.3  0.0 - 0.5 % Final   • Immature Grans Absolute 08/09/2021 0.02  0.00 - 0.05 10*3/mm3 Final   • nRBC 08/09/2021 0.0  0.0 - 0.2 /100 WBC Final   • Iron 08/09/2021 38  37 - 145 mcg/dL Final   • Vitamin B-12 08/09/2021 652  211 - 946 pg/mL Final    Results may be falsely increased if patient taking Biotin.   • 25 Hydroxy, Vitamin D 08/09/2021 35.3  30.0 - 100.0 ng/ml Final    Comment: Results may be falsely increased if patient taking Biotin.  Reference Range for Total Vitamin D 25(OH)  Deficiency <20.0 ng/mL  Insufficiency 21-29 ng/mL  Sufficiency  ng/mL  Toxicity >100 ng/ml     Office Visit on 07/26/2021   Component Date Value Ref Range Status   • Glucose 08/09/2021 85  65 - 99 mg/dL Final   • BUN 08/09/2021 8  6 - 20 mg/dL Final   • Creatinine 08/09/2021 0.78  0.57 - 1.00 mg/dL Final   • eGFR Non  Am 08/09/2021 92  >60 mL/min/1.73 Final    Comment: GFR Normal >60  Chronic Kidney Disease <60  Kidney Failure <15     • eGFR  Am 08/09/2021 112  >60  mL/min/1.73 Final   • BUN/Creatinine Ratio 08/09/2021 10.3  7.0 - 25.0 Final   • Sodium 08/09/2021 141  136 - 145 mmol/L Final   • Potassium 08/09/2021 4.0  3.5 - 5.2 mmol/L Final   • Chloride 08/09/2021 105  98 - 107 mmol/L Final   • Total CO2 08/09/2021 22.8  22.0 - 29.0 mmol/L Final   • Calcium 08/09/2021 9.7  8.6 - 10.5 mg/dL Final   • Total Protein 08/09/2021 7.2  6.0 - 8.5 g/dL Final   • Albumin 08/09/2021 4.60  3.50 - 5.20 g/dL Final   • Globulin 08/09/2021 2.6  gm/dL Final   • A/G Ratio 08/09/2021 1.8  g/dL Final   • Total Bilirubin 08/09/2021 0.2  0.0 - 1.2 mg/dL Final   • Alkaline Phosphatase 08/09/2021 75  39 - 117 U/L Final   • AST (SGOT) 08/09/2021 17  1 - 32 U/L Final   • ALT (SGPT) 08/09/2021 11  1 - 33 U/L Final   • TSH 08/09/2021 2.940  0.450 - 4.500 uIU/mL Final   • T4, Total 08/09/2021 6.4  4.5 - 12.0 ug/dL Final   • T3 Uptake 08/09/2021 25  24 - 39 % Final   • Free Thyroxine Index 08/09/2021 1.6  1.2 - 4.9 Final       Mental Status Exam:   Hygiene:   good  Cooperation:  Cooperative  Eye Contact:  Good  Psychomotor Behavior:  Appropriate  Mood:within normal limits  Affect:  Appropriate  Hopelessness: Denies  Speech:  Normal  Thought Process:  Goal directed  Thought Content:  Normal  Suicidal:  None  Homicidal:  None  Hallucinations:  None  Delusion:  None  Memory:  Intact  Orientation:  Person, Place, Time and Situation  Reliability:  good  Insight:  Good  Judgement:  Good  Impulse Control:  Good  Physical/Medical Issues:  No     PHQ-9 Depression Screening  Little interest or pleasure in doing things?     Feeling down, depressed, or hopeless?     Trouble falling or staying asleep, or sleeping too much?     Feeling tired or having little energy?     Poor appetite or overeating?     Feeling bad about yourself - or that you are a failure or have let yourself or your family down?     Trouble concentrating on things, such as reading the newspaper or watching television?     Moving or speaking so slowly  that other people could have noticed? Or the opposite - being so fidgety or restless that you have been moving around a lot more than usual?     Thoughts that you would be better off dead, or of hurting yourself in some way?     PHQ-9 Total Score     If you checked off any problems, how difficult have these problems made it for you to do your work, take care of things at home, or get along with other people?          Assessment/Plan:  Diagnoses and all orders for this visit:    1. Attention deficit hyperactivity disorder, combined type (Primary)  -     amphetamine-dextroamphetamine (Adderall) 10 MG tablet; Take 10 mg orally every afternoon.  Dispense: 30 tablet; Refill: 0    2. HARJEET (generalized anxiety disorder)  -     hydrOXYzine (ATARAX) 25 MG tablet; 1/2-1 tablet by mouth 1-2 times per day as needed for anxiety  Dispense: 30 tablet; Refill: 6  -     FLUoxetine (PROzac) 20 MG capsule; Take 1 capsule by mouth Daily.  Dispense: 30 capsule; Refill: 6    3. Mild episode of recurrent major depressive disorder (HCC)  -     FLUoxetine (PROzac) 20 MG capsule; Take 1 capsule by mouth Daily.  Dispense: 30 capsule; Refill: 6      Will resend Adderall 10 mg every afternoon with a message to pharmacy to please let us know if her secondary insurance will not cover this since primary is not wanting to.  We will continue to monitor weight.  ilab video appointment start time 8:49 AM.  End time 9 AM.    A psychological evaluation was conducted in order to assess past and current level of functioning. Areas assessed included, but were not limited to: perception of social support, perception of ability to face and deal with challenges in life (positive functioning), anxiety symptoms, depressive symptoms, perspective on beliefs/belief system, coping skills for stress, intelligence level,  Therapeutic rapport was established. Interventions conducted today were geared towards incorporating medication management along with support for  continued therapy. Education was also provided as to the med management with this provider and what to expect in subsequent sessions.    We discussed risks, benefits,goals and side effects of the above medication and the patient was agreeable with the plan.Patient was educated on the importance of compliance with treatment and follow-up appointments. [facility] To call for questions or concerns and return early if necessary. Patent is agreeable to go to the Emergency Department or call 911 should they begin SI/HI.     Treatment Plan:   Discussed risks, benefits, and alternatives of medication. Encouraged healthy habits (eating, exercise and sleep). Call if any questions or problems arise. Medication reconciled. Controlled substance monitoring report reviewed. Provided psychoeducation.. Discussed coping strategies and current stressors. Set appropriate boundaries and limits for patient's well-being. Use distraction techniques to improve symptoms. Access support networks.      Return in about 4 weeks (around 10/27/2021) for Follow Up 15 min.    AKIRA Olvera
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
## 2021-10-11 ENCOUNTER — OFFICE VISIT (OUTPATIENT)
Dept: BEHAVIORAL HEALTH | Facility: CLINIC | Age: 21
End: 2021-10-11

## 2021-10-11 VITALS — WEIGHT: 105 LBS | BODY MASS INDEX: 20.62 KG/M2 | HEIGHT: 60 IN

## 2021-10-11 DIAGNOSIS — F41.1 GAD (GENERALIZED ANXIETY DISORDER): Primary | ICD-10-CM

## 2021-10-11 DIAGNOSIS — F90.2 ATTENTION DEFICIT HYPERACTIVITY DISORDER, COMBINED TYPE: ICD-10-CM

## 2021-10-11 PROCEDURE — 99213 OFFICE O/P EST LOW 20 MIN: CPT | Performed by: NURSE PRACTITIONER

## 2021-10-11 RX ORDER — DEXTROAMPHETAMINE SACCHARATE, AMPHETAMINE ASPARTATE MONOHYDRATE, DEXTROAMPHETAMINE SULFATE AND AMPHETAMINE SULFATE 5; 5; 5; 5 MG/1; MG/1; MG/1; MG/1
20 CAPSULE, EXTENDED RELEASE ORAL EVERY MORNING
Qty: 30 CAPSULE | Refills: 0 | Status: SHIPPED | OUTPATIENT
Start: 2021-10-11 | End: 2021-11-16

## 2021-10-11 RX ORDER — FLUOXETINE HYDROCHLORIDE 40 MG/1
40 CAPSULE ORAL DAILY
Qty: 30 CAPSULE | Refills: 3 | Status: SHIPPED | OUTPATIENT
Start: 2021-10-11 | End: 2021-11-30 | Stop reason: SDUPTHER

## 2021-10-11 RX ORDER — DEXTROAMPHETAMINE SACCHARATE, AMPHETAMINE ASPARTATE, DEXTROAMPHETAMINE SULFATE AND AMPHETAMINE SULFATE 2.5; 2.5; 2.5; 2.5 MG/1; MG/1; MG/1; MG/1
TABLET ORAL
Qty: 30 TABLET | Refills: 0 | Status: SHIPPED | OUTPATIENT
Start: 2021-10-11 | End: 2021-11-22 | Stop reason: SDUPTHER

## 2021-10-11 NOTE — PROGRESS NOTES
Patient Name: Beatriz Rai  MRN: [id number]   :  2000     Chief Complaint:      ICD-10-CM ICD-9-CM   1. HARJEET (generalized anxiety disorder)  F41.1 300.02   2. Attention deficit hyperactivity disorder, combined type  F90.2 314.01       History of Present Illness: Beatriz Rai is a 21 y.o. female is here today for medication management follow up.  Patient states she is having some increased anxiety and depression.  Wondering about an increase with Prozac.  The following portions of the patient's history were reviewed and updated as appropriate: allergies, current medications, past family history, past medical history, past social history, past surgical history and problem list.    Review of Systems;;  Review of Systems   Constitutional: Positive for appetite change and unexpected weight loss. Negative for activity change, fatigue and unexpected weight gain.   Respiratory: Negative for shortness of breath and wheezing.    Gastrointestinal: Negative for constipation, diarrhea, nausea and vomiting.   Musculoskeletal: Negative for gait problem.   Skin: Negative for dry skin and rash.   Neurological: Negative for dizziness, speech difficulty, weakness, light-headedness, headache, memory problem and confusion.   Psychiatric/Behavioral: Positive for decreased concentration and positive for hyperactivity. Negative for agitation, behavioral problems, dysphoric mood, hallucinations, self-injury, sleep disturbance, suicidal ideas, depressed mood and stress. The patient is not nervous/anxious.        Physical Exam;;  Physical Exam  Vitals and nursing note reviewed.   Constitutional:       General: She is not in acute distress.     Appearance: She is well-developed. She is not diaphoretic.   HENT:      Head: Normocephalic and atraumatic.   Eyes:      Conjunctiva/sclera: Conjunctivae normal.   Cardiovascular:      Rate and Rhythm: Normal rate.   Pulmonary:      Effort: Pulmonary effort is normal. No respiratory  "distress.   Musculoskeletal:         General: Normal range of motion.      Cervical back: Full passive range of motion without pain and normal range of motion.   Skin:     General: Skin is warm and dry.   Neurological:      Mental Status: She is alert and oriented to person, place, and time.   Psychiatric:         Mood and Affect: Mood is not anxious or depressed. Affect is not labile, blunt, angry or inappropriate.         Speech: Speech is not rapid and pressured or tangential.         Behavior: Behavior normal. Behavior is not agitated, slowed, aggressive, withdrawn, hyperactive or combative. Behavior is cooperative.         Thought Content: Thought content normal. Thought content is not paranoid or delusional. Thought content does not include homicidal or suicidal ideation. Thought content does not include homicidal or suicidal plan.         Judgment: Judgment normal.       Height 152.4 cm (60\"), weight 47.6 kg (105 lb), unknown if currently breastfeeding.  Body mass index is 20.51 kg/m².    Current Medications;;    Current Outpatient Medications:   •  amphetamine-dextroamphetamine (Adderall) 10 MG tablet, Take 10 mg orally every afternoon., Disp: 30 tablet, Rfl: 0  •  amphetamine-dextroamphetamine XR (Adderall XR) 20 MG 24 hr capsule, Take 1 capsule by mouth Every Morning, Disp: 30 capsule, Rfl: 0  •  FLUoxetine (PROzac) 20 MG capsule, Take 1 capsule by mouth Daily., Disp: 30 capsule, Rfl: 6  •  hydrOXYzine (ATARAX) 25 MG tablet, 1/2-1 tablet by mouth 1-2 times per day as needed for anxiety, Disp: 30 tablet, Rfl: 6  •  FLUoxetine (PROzac) 40 MG capsule, Take 1 capsule by mouth Daily., Disp: 30 capsule, Rfl: 3    Lab Results:   Results Encounter on 07/31/2021   Component Date Value Ref Range Status   • WBC 08/09/2021 6.71  3.40 - 10.80 10*3/mm3 Final   • RBC 08/09/2021 5.18  3.77 - 5.28 10*6/mm3 Final   • Hemoglobin 08/09/2021 15.4  12.0 - 15.9 g/dL Final   • Hematocrit 08/09/2021 46.8* 34.0 - 46.6 % Final   • " MCV 08/09/2021 90.3  79.0 - 97.0 fL Final   • MCH 08/09/2021 29.7  26.6 - 33.0 pg Final   • MCHC 08/09/2021 32.9  31.5 - 35.7 g/dL Final   • RDW 08/09/2021 12.8  12.3 - 15.4 % Final   • Platelets 08/09/2021 347  140 - 450 10*3/mm3 Final   • Neutrophil Rel % 08/09/2021 53.2  42.7 - 76.0 % Final   • Lymphocyte Rel % 08/09/2021 36.2  19.6 - 45.3 % Final   • Monocyte Rel % 08/09/2021 6.6  5.0 - 12.0 % Final   • Eosinophil Rel % 08/09/2021 2.8  0.3 - 6.2 % Final   • Basophil Rel % 08/09/2021 0.9  0.0 - 1.5 % Final   • Neutrophils Absolute 08/09/2021 3.57  1.70 - 7.00 10*3/mm3 Final   • Lymphocytes Absolute 08/09/2021 2.43  0.70 - 3.10 10*3/mm3 Final   • Monocytes Absolute 08/09/2021 0.44  0.10 - 0.90 10*3/mm3 Final   • Eosinophils Absolute 08/09/2021 0.19  0.00 - 0.40 10*3/mm3 Final   • Basophils Absolute 08/09/2021 0.06  0.00 - 0.20 10*3/mm3 Final   • Immature Granulocyte Rel % 08/09/2021 0.3  0.0 - 0.5 % Final   • Immature Grans Absolute 08/09/2021 0.02  0.00 - 0.05 10*3/mm3 Final   • nRBC 08/09/2021 0.0  0.0 - 0.2 /100 WBC Final   • Iron 08/09/2021 38  37 - 145 mcg/dL Final   • Vitamin B-12 08/09/2021 652  211 - 946 pg/mL Final    Results may be falsely increased if patient taking Biotin.   • 25 Hydroxy, Vitamin D 08/09/2021 35.3  30.0 - 100.0 ng/ml Final    Comment: Results may be falsely increased if patient taking Biotin.  Reference Range for Total Vitamin D 25(OH)  Deficiency <20.0 ng/mL  Insufficiency 21-29 ng/mL  Sufficiency  ng/mL  Toxicity >100 ng/ml     Office Visit on 07/26/2021   Component Date Value Ref Range Status   • Glucose 08/09/2021 85  65 - 99 mg/dL Final   • BUN 08/09/2021 8  6 - 20 mg/dL Final   • Creatinine 08/09/2021 0.78  0.57 - 1.00 mg/dL Final   • eGFR Non  Am 08/09/2021 92  >60 mL/min/1.73 Final    Comment: GFR Normal >60  Chronic Kidney Disease <60  Kidney Failure <15     • eGFR  Am 08/09/2021 112  >60 mL/min/1.73 Final   • BUN/Creatinine Ratio 08/09/2021 10.3  7.0 -  25.0 Final   • Sodium 08/09/2021 141  136 - 145 mmol/L Final   • Potassium 08/09/2021 4.0  3.5 - 5.2 mmol/L Final   • Chloride 08/09/2021 105  98 - 107 mmol/L Final   • Total CO2 08/09/2021 22.8  22.0 - 29.0 mmol/L Final   • Calcium 08/09/2021 9.7  8.6 - 10.5 mg/dL Final   • Total Protein 08/09/2021 7.2  6.0 - 8.5 g/dL Final   • Albumin 08/09/2021 4.60  3.50 - 5.20 g/dL Final   • Globulin 08/09/2021 2.6  gm/dL Final   • A/G Ratio 08/09/2021 1.8  g/dL Final   • Total Bilirubin 08/09/2021 0.2  0.0 - 1.2 mg/dL Final   • Alkaline Phosphatase 08/09/2021 75  39 - 117 U/L Final   • AST (SGOT) 08/09/2021 17  1 - 32 U/L Final   • ALT (SGPT) 08/09/2021 11  1 - 33 U/L Final   • TSH 08/09/2021 2.940  0.450 - 4.500 uIU/mL Final   • T4, Total 08/09/2021 6.4  4.5 - 12.0 ug/dL Final   • T3 Uptake 08/09/2021 25  24 - 39 % Final   • Free Thyroxine Index 08/09/2021 1.6  1.2 - 4.9 Final       Mental Status Exam:   Hygiene:   good  Cooperation:  Cooperative  Eye Contact:  Good  Psychomotor Behavior:  Appropriate  Mood:within normal limits  Affect:  Appropriate  Hopelessness: Denies  Speech:  Normal  Thought Process:  Goal directed  Thought Content:  Normal  Suicidal:  None  Homicidal:  None  Hallucinations:  None  Delusion:  None  Memory:  Intact  Orientation:  Person, Place, Time and Situation  Reliability:  good  Insight:  Good  Judgement:  Good  Impulse Control:  Good  Physical/Medical Issues:  No     PHQ-9 Depression Screening  Little interest or pleasure in doing things? 0   Feeling down, depressed, or hopeless? 1   Trouble falling or staying asleep, or sleeping too much? 1   Feeling tired or having little energy? 1   Poor appetite or overeating? 0   Feeling bad about yourself - or that you are a failure or have let yourself or your family down? 1   Trouble concentrating on things, such as reading the newspaper or watching television? 3 (Watching TV)   Moving or speaking so slowly that other people could have noticed? Or the  opposite - being so fidgety or restless that you have been moving around a lot more than usual? 0   Thoughts that you would be better off dead, or of hurting yourself in some way? 0   PHQ-9 Total Score 7   If you checked off any problems, how difficult have these problems made it for you to do your work, take care of things at home, or get along with other people? Not difficult at all        Assessment/Plan:  Diagnoses and all orders for this visit:    1. HARJEET (generalized anxiety disorder) (Primary)  -     FLUoxetine (PROzac) 40 MG capsule; Take 1 capsule by mouth Daily.  Dispense: 30 capsule; Refill: 3    2. Attention deficit hyperactivity disorder, combined type  -     amphetamine-dextroamphetamine (Adderall) 10 MG tablet; Take 10 mg orally every afternoon.  Dispense: 30 tablet; Refill: 0  -     amphetamine-dextroamphetamine XR (Adderall XR) 20 MG 24 hr capsule; Take 1 capsule by mouth Every Morning  Dispense: 30 capsule; Refill: 0      Increase Prozac to 40 mg daily.  Will reevaluate in a month.    A psychological evaluation was conducted in order to assess past and current level of functioning. Areas assessed included, but were not limited to: perception of social support, perception of ability to face and deal with challenges in life (positive functioning), anxiety symptoms, depressive symptoms, perspective on beliefs/belief system, coping skills for stress, intelligence level,  Therapeutic rapport was established. Interventions conducted today were geared towards incorporating medication management along with support for continued therapy. Education was also provided as to the med management with this provider and what to expect in subsequent sessions.    We discussed risks, benefits,goals and side effects of the above medication and the patient was agreeable with the plan.Patient was educated on the importance of compliance with treatment and follow-up appointments. Patient is aware to contact the [facility] with any worsening of symptoms. To call for questions or concerns and return early if necessary. Patent is agreeable to go to the Emergency Department or call 911 should they begin SI/HI.     Treatment Plan:   Discussed risks, benefits, and alternatives of medication. Encouraged healthy habits (eating, exercise and sleep). Call if any questions or problems arise. Medication reconciled. Controlled substance monitoring report reviewed. Provided psychoeducation.. Discussed coping strategies and current stressors. Set appropriate boundaries and limits for patient's well-being. Use distraction techniques to improve symptoms. Access support networks.      Return in about 4 weeks (around 11/8/2021) for Video visit.    Mirella Mujica, APRN

## 2021-10-19 DIAGNOSIS — F90.2 ATTENTION DEFICIT HYPERACTIVITY DISORDER, COMBINED TYPE: ICD-10-CM

## 2021-10-19 RX ORDER — DEXTROAMPHETAMINE SACCHARATE, AMPHETAMINE ASPARTATE MONOHYDRATE, DEXTROAMPHETAMINE SULFATE AND AMPHETAMINE SULFATE 5; 5; 5; 5 MG/1; MG/1; MG/1; MG/1
20 CAPSULE, EXTENDED RELEASE ORAL EVERY MORNING
Qty: 30 CAPSULE | Refills: 0 | OUTPATIENT
Start: 2021-10-19

## 2021-11-08 ENCOUNTER — TELEMEDICINE (OUTPATIENT)
Dept: BEHAVIORAL HEALTH | Facility: CLINIC | Age: 21
End: 2021-11-08

## 2021-11-08 DIAGNOSIS — F41.1 GAD (GENERALIZED ANXIETY DISORDER): ICD-10-CM

## 2021-11-08 DIAGNOSIS — F33.0 MILD EPISODE OF RECURRENT MAJOR DEPRESSIVE DISORDER (HCC): ICD-10-CM

## 2021-11-08 DIAGNOSIS — F90.2 ATTENTION DEFICIT HYPERACTIVITY DISORDER, COMBINED TYPE: Primary | ICD-10-CM

## 2021-11-08 PROCEDURE — 99213 OFFICE O/P EST LOW 20 MIN: CPT | Performed by: NURSE PRACTITIONER

## 2021-11-08 NOTE — PROGRESS NOTES
Patient Name: Beatriz Rai  MRN: [id number]   :  2000     Chief Complaint:      ICD-10-CM ICD-9-CM   1. Attention deficit hyperactivity disorder, combined type  F90.2 314.01   2. HARJEET (generalized anxiety disorder)  F41.1 300.02   3. Mild episode of recurrent major depressive disorder (HCC)  F33.0 296.31       History of Present Illness: Beatriz Rai is a 21 y.o. female is here today for medication management follow up.  Some worry. Adderall working well. Sleep is good.   The following portions of the patient's history were reviewed and updated as appropriate: allergies, current medications, past family history, past medical history, past social history, past surgical history and problem list.    Review of Systems;;  Review of Systems   Constitutional: Positive for appetite change and unexpected weight loss. Negative for activity change, fatigue and unexpected weight gain.   Respiratory: Negative for shortness of breath and wheezing.    Gastrointestinal: Negative for constipation, diarrhea, nausea and vomiting.   Musculoskeletal: Negative for gait problem.   Skin: Negative for dry skin and rash.   Neurological: Negative for dizziness, speech difficulty, weakness, light-headedness, headache, memory problem and confusion.   Psychiatric/Behavioral: Positive for decreased concentration and positive for hyperactivity. Negative for agitation, behavioral problems, dysphoric mood, hallucinations, self-injury, sleep disturbance, suicidal ideas, depressed mood and stress. The patient is not nervous/anxious.        Physical Exam;;  Physical Exam  Vitals and nursing note reviewed.   Constitutional:       General: She is not in acute distress.     Appearance: She is well-developed. She is not diaphoretic.   HENT:      Head: Normocephalic and atraumatic.   Eyes:      Conjunctiva/sclera: Conjunctivae normal.   Cardiovascular:      Rate and Rhythm: Normal rate.   Pulmonary:      Effort: Pulmonary effort is  normal. No respiratory distress.   Musculoskeletal:         General: Normal range of motion.      Cervical back: Full passive range of motion without pain and normal range of motion.   Skin:     General: Skin is warm and dry.   Neurological:      Mental Status: She is alert and oriented to person, place, and time.   Psychiatric:         Mood and Affect: Mood is not anxious or depressed. Affect is not labile, blunt, angry or inappropriate.         Speech: Speech is not rapid and pressured or tangential.         Behavior: Behavior normal. Behavior is not agitated, slowed, aggressive, withdrawn, hyperactive or combative. Behavior is cooperative.         Thought Content: Thought content normal. Thought content is not paranoid or delusional. Thought content does not include homicidal or suicidal ideation. Thought content does not include homicidal or suicidal plan.         Judgment: Judgment normal.       unknown if currently breastfeeding.  There is no height or weight on file to calculate BMI.    Current Medications;;    Current Outpatient Medications:   •  amphetamine-dextroamphetamine (Adderall) 10 MG tablet, Take 10 mg orally every afternoon., Disp: 30 tablet, Rfl: 0  •  FLUoxetine (PROzac) 20 MG capsule, Take 1 capsule by mouth Daily., Disp: 30 capsule, Rfl: 6  •  FLUoxetine (PROzac) 40 MG capsule, Take 1 capsule by mouth Daily., Disp: 30 capsule, Rfl: 3  •  hydrOXYzine (ATARAX) 25 MG tablet, 1/2-1 tablet by mouth 1-2 times per day as needed for anxiety, Disp: 30 tablet, Rfl: 6  •  Adderall XR 20 MG 24 hr capsule, TAKE 1 CAPSULE BY MOUTH EVERY MORNING, Disp: 30 capsule, Rfl: 0    Lab Results:   No visits with results within 3 Month(s) from this visit.   Latest known visit with results is:   Results Encounter on 07/31/2021   Component Date Value Ref Range Status   • WBC 08/09/2021 6.71  3.40 - 10.80 10*3/mm3 Final   • RBC 08/09/2021 5.18  3.77 - 5.28 10*6/mm3 Final   • Hemoglobin 08/09/2021 15.4  12.0 - 15.9 g/dL  Final   • Hematocrit 08/09/2021 46.8* 34.0 - 46.6 % Final   • MCV 08/09/2021 90.3  79.0 - 97.0 fL Final   • MCH 08/09/2021 29.7  26.6 - 33.0 pg Final   • MCHC 08/09/2021 32.9  31.5 - 35.7 g/dL Final   • RDW 08/09/2021 12.8  12.3 - 15.4 % Final   • Platelets 08/09/2021 347  140 - 450 10*3/mm3 Final   • Neutrophil Rel % 08/09/2021 53.2  42.7 - 76.0 % Final   • Lymphocyte Rel % 08/09/2021 36.2  19.6 - 45.3 % Final   • Monocyte Rel % 08/09/2021 6.6  5.0 - 12.0 % Final   • Eosinophil Rel % 08/09/2021 2.8  0.3 - 6.2 % Final   • Basophil Rel % 08/09/2021 0.9  0.0 - 1.5 % Final   • Neutrophils Absolute 08/09/2021 3.57  1.70 - 7.00 10*3/mm3 Final   • Lymphocytes Absolute 08/09/2021 2.43  0.70 - 3.10 10*3/mm3 Final   • Monocytes Absolute 08/09/2021 0.44  0.10 - 0.90 10*3/mm3 Final   • Eosinophils Absolute 08/09/2021 0.19  0.00 - 0.40 10*3/mm3 Final   • Basophils Absolute 08/09/2021 0.06  0.00 - 0.20 10*3/mm3 Final   • Immature Granulocyte Rel % 08/09/2021 0.3  0.0 - 0.5 % Final   • Immature Grans Absolute 08/09/2021 0.02  0.00 - 0.05 10*3/mm3 Final   • nRBC 08/09/2021 0.0  0.0 - 0.2 /100 WBC Final   • Iron 08/09/2021 38  37 - 145 mcg/dL Final   • Vitamin B-12 08/09/2021 652  211 - 946 pg/mL Final    Results may be falsely increased if patient taking Biotin.   • 25 Hydroxy, Vitamin D 08/09/2021 35.3  30.0 - 100.0 ng/ml Final    Comment: Results may be falsely increased if patient taking Biotin.  Reference Range for Total Vitamin D 25(OH)  Deficiency <20.0 ng/mL  Insufficiency 21-29 ng/mL  Sufficiency  ng/mL  Toxicity >100 ng/ml         Mental Status Exam:   Hygiene:   good  Cooperation:  Cooperative  Eye Contact:  Good  Psychomotor Behavior:  Appropriate  Mood:within normal limits  Affect:  Appropriate  Hopelessness: Denies  Speech:  Normal  Thought Process:  Goal directed  Thought Content:  Normal  Suicidal:  None  Homicidal:  None  Hallucinations:  None  Delusion:  None  Memory:  Intact  Orientation:  Person, Place,  Time and Situation  Reliability:  good  Insight:  Good  Judgement:  Good  Impulse Control:  Good  Physical/Medical Issues:  No     PHQ-9 Depression Screening  Little interest or pleasure in doing things? 0   Feeling down, depressed, or hopeless? 0   Trouble falling or staying asleep, or sleeping too much? 0   Feeling tired or having little energy? 0   Poor appetite or overeating? 1 (OVEREATING)   Feeling bad about yourself - or that you are a failure or have let yourself or your family down? 1   Trouble concentrating on things, such as reading the newspaper or watching television? 0   Moving or speaking so slowly that other people could have noticed? Or the opposite - being so fidgety or restless that you have been moving around a lot more than usual? 0   Thoughts that you would be better off dead, or of hurting yourself in some way? 0   PHQ-9 Total Score 2   If you checked off any problems, how difficult have these problems made it for you to do your work, take care of things at home, or get along with other people? Not difficult at all        Assessment/Plan:  Diagnoses and all orders for this visit:    1. Attention deficit hyperactivity disorder, combined type (Primary)    2. HARJEET (generalized anxiety disorder)    3. Mild episode of recurrent major depressive disorder (HCC)      Doing well. Continue meds as ordered.  My Chart start time 11:04 am end time 11:15 am.    A psychological evaluation was conducted in order to assess past and current level of functioning. Areas assessed included, but were not limited to: perception of social support, perception of ability to face and deal with challenges in life (positive functioning), anxiety symptoms, depressive symptoms, perspective on beliefs/belief system, coping skills for stress, intelligence level,  Therapeutic rapport was established. Interventions conducted today were geared towards incorporating medication management along with support for continued therapy.  Education was also provided as to the med management with this provider and what to expect in subsequent sessions.    We discussed risks, benefits,goals and side effects of the above medication and the patient was agreeable with the plan.Patient was educated on the importance of compliance with treatment and follow-up appointments. Patient is aware to contact the [facility] with any worsening of symptoms. To call for questions or concerns and return early if necessary. Patent is agreeable to go to the Emergency Department or call 911 should they begin SI/HI.     Treatment Plan:   Discussed risks, benefits, and alternatives of medication. Encouraged healthy habits (eating, exercise and sleep). Call if any questions or problems arise. Medication reconciled. Controlled substance monitoring report reviewed. Provided psychoeducation.. Discussed coping strategies and current stressors. Set appropriate boundaries and limits for patient's well-being. Use distraction techniques to improve symptoms. Access support networks.      Return in about 3 months (around 2/8/2022) for Video visit, Follow Up 30 min.    AKIRA Olvera

## 2021-11-16 DIAGNOSIS — F90.2 ATTENTION DEFICIT HYPERACTIVITY DISORDER, COMBINED TYPE: ICD-10-CM

## 2021-11-16 RX ORDER — DEXTROAMPHETAMINE SULFATE, DEXTROAMPHETAMINE SACCHARATE, AMPHETAMINE SULFATE AND AMPHETAMINE ASPARTATE 5; 5; 5; 5 MG/1; MG/1; MG/1; MG/1
CAPSULE, EXTENDED RELEASE ORAL
Qty: 30 CAPSULE | Refills: 0 | Status: SHIPPED | OUTPATIENT
Start: 2021-11-16 | End: 2021-12-20

## 2021-11-22 DIAGNOSIS — F90.2 ATTENTION DEFICIT HYPERACTIVITY DISORDER, COMBINED TYPE: ICD-10-CM

## 2021-11-22 RX ORDER — DEXTROAMPHETAMINE SACCHARATE, AMPHETAMINE ASPARTATE, DEXTROAMPHETAMINE SULFATE AND AMPHETAMINE SULFATE 2.5; 2.5; 2.5; 2.5 MG/1; MG/1; MG/1; MG/1
TABLET ORAL
Qty: 30 TABLET | Refills: 0 | Status: SHIPPED | OUTPATIENT
Start: 2021-11-22 | End: 2021-12-22 | Stop reason: SDUPTHER

## 2021-11-30 DIAGNOSIS — F41.1 GAD (GENERALIZED ANXIETY DISORDER): ICD-10-CM

## 2021-11-30 RX ORDER — FLUOXETINE HYDROCHLORIDE 40 MG/1
40 CAPSULE ORAL DAILY
Qty: 30 CAPSULE | Refills: 3 | Status: SHIPPED | OUTPATIENT
Start: 2021-11-30 | End: 2022-01-03 | Stop reason: SDUPTHER

## 2021-11-30 NOTE — TELEPHONE ENCOUNTER
Rx Refill Note  Requested Prescriptions     Pending Prescriptions Disp Refills   • FLUoxetine (PROzac) 40 MG capsule 30 capsule 3     Sig: Take 1 capsule by mouth Daily.      Last office visit with prescribing clinician: 10/11/2021      Next office visit with prescribing clinician: Visit date not found            Maddie Glez MA  11/30/21, 10:36 EST

## 2021-12-08 ENCOUNTER — OFFICE VISIT (OUTPATIENT)
Dept: BEHAVIORAL HEALTH | Facility: CLINIC | Age: 21
End: 2021-12-08

## 2021-12-08 VITALS — HEIGHT: 60 IN | WEIGHT: 106 LBS | BODY MASS INDEX: 20.81 KG/M2

## 2021-12-08 DIAGNOSIS — F41.1 GENERALIZED ANXIETY DISORDER: Primary | ICD-10-CM

## 2021-12-08 PROCEDURE — 90834 PSYTX W PT 45 MINUTES: CPT | Performed by: COUNSELOR

## 2021-12-20 DIAGNOSIS — F90.2 ATTENTION DEFICIT HYPERACTIVITY DISORDER, COMBINED TYPE: ICD-10-CM

## 2021-12-20 RX ORDER — DEXTROAMPHETAMINE SULFATE, DEXTROAMPHETAMINE SACCHARATE, AMPHETAMINE SULFATE AND AMPHETAMINE ASPARTATE 5; 5; 5; 5 MG/1; MG/1; MG/1; MG/1
CAPSULE, EXTENDED RELEASE ORAL
Qty: 30 CAPSULE | Refills: 0 | Status: SHIPPED | OUTPATIENT
Start: 2021-12-20 | End: 2022-01-20 | Stop reason: SDUPTHER

## 2021-12-22 DIAGNOSIS — F41.1 GAD (GENERALIZED ANXIETY DISORDER): ICD-10-CM

## 2021-12-22 DIAGNOSIS — F90.2 ATTENTION DEFICIT HYPERACTIVITY DISORDER, COMBINED TYPE: ICD-10-CM

## 2021-12-22 RX ORDER — DEXTROAMPHETAMINE SACCHARATE, AMPHETAMINE ASPARTATE, DEXTROAMPHETAMINE SULFATE AND AMPHETAMINE SULFATE 2.5; 2.5; 2.5; 2.5 MG/1; MG/1; MG/1; MG/1
TABLET ORAL
Qty: 30 TABLET | Refills: 0 | Status: SHIPPED | OUTPATIENT
Start: 2021-12-22 | End: 2022-02-21

## 2021-12-22 RX ORDER — HYDROXYZINE HYDROCHLORIDE 25 MG/1
TABLET, FILM COATED ORAL
Qty: 30 TABLET | Refills: 6 | Status: SHIPPED | OUTPATIENT
Start: 2021-12-22 | End: 2022-02-23

## 2021-12-22 NOTE — TELEPHONE ENCOUNTER
Rx Refill Note  Requested Prescriptions     Pending Prescriptions Disp Refills   • amphetamine-dextroamphetamine (Adderall) 10 MG tablet 30 tablet 0     Sig: Take 10 mg orally every afternoon.   • hydrOXYzine (ATARAX) 25 MG tablet 30 tablet 6     Si/2-1 tablet by mouth 1-2 times per day as needed for anxiety      Last office visit with prescribing clinician: 2021      Next office visit with prescribing clinician: Visit date not found            Maddie Glez MA  21, 12:54 EST
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
## 2022-01-03 DIAGNOSIS — F41.1 GAD (GENERALIZED ANXIETY DISORDER): ICD-10-CM

## 2022-01-03 RX ORDER — FLUOXETINE HYDROCHLORIDE 40 MG/1
40 CAPSULE ORAL DAILY
Qty: 30 CAPSULE | Refills: 3 | Status: SHIPPED | OUTPATIENT
Start: 2022-01-03 | End: 2022-02-23

## 2022-01-03 NOTE — TELEPHONE ENCOUNTER
Rx Refill Note  Requested Prescriptions     Pending Prescriptions Disp Refills   • FLUoxetine (PROzac) 40 MG capsule 30 capsule 3     Sig: Take 1 capsule by mouth Daily.      Last office visit with prescribing clinician: 10/11/2021      Next office visit with prescribing clinician: Visit date not found            Maddie Glez MA  01/03/22, 15:58 EST

## 2022-01-05 ENCOUNTER — OFFICE VISIT (OUTPATIENT)
Dept: BEHAVIORAL HEALTH | Facility: CLINIC | Age: 22
End: 2022-01-05

## 2022-01-05 VITALS — BODY MASS INDEX: 20.81 KG/M2 | WEIGHT: 106 LBS | HEIGHT: 60 IN

## 2022-01-05 DIAGNOSIS — F41.1 GAD (GENERALIZED ANXIETY DISORDER): Primary | ICD-10-CM

## 2022-01-05 PROCEDURE — 90832 PSYTX W PT 30 MINUTES: CPT | Performed by: COUNSELOR

## 2022-01-20 DIAGNOSIS — F90.2 ATTENTION DEFICIT HYPERACTIVITY DISORDER, COMBINED TYPE: ICD-10-CM

## 2022-01-20 RX ORDER — DEXTROAMPHETAMINE SACCHARATE, AMPHETAMINE ASPARTATE MONOHYDRATE, DEXTROAMPHETAMINE SULFATE AND AMPHETAMINE SULFATE 5; 5; 5; 5 MG/1; MG/1; MG/1; MG/1
20 CAPSULE, EXTENDED RELEASE ORAL EVERY MORNING
Qty: 30 CAPSULE | Refills: 0 | Status: SHIPPED | OUTPATIENT
Start: 2022-01-20 | End: 2022-02-23 | Stop reason: SDUPTHER
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
## 2022-01-26 ENCOUNTER — OFFICE VISIT (OUTPATIENT)
Dept: BEHAVIORAL HEALTH | Facility: CLINIC | Age: 22
End: 2022-01-26

## 2022-01-26 VITALS — HEIGHT: 60 IN | BODY MASS INDEX: 21.62 KG/M2 | WEIGHT: 110.13 LBS

## 2022-01-26 DIAGNOSIS — F33.0 MILD EPISODE OF RECURRENT MAJOR DEPRESSIVE DISORDER: Primary | ICD-10-CM

## 2022-01-26 PROCEDURE — 90832 PSYTX W PT 30 MINUTES: CPT | Performed by: COUNSELOR

## 2022-02-10 NOTE — TELEPHONE ENCOUNTER
LOV 10/11/2021  Patient has multiple f/u with Janey, but I do not see any follow ups with you   Tolerated PO, 5 hr obs, well appearing and playful.  Discussed /counseled on lowering bed and safe sleep. -Mita Mckenna MD

## 2022-02-21 DIAGNOSIS — F90.2 ATTENTION DEFICIT HYPERACTIVITY DISORDER, COMBINED TYPE: ICD-10-CM

## 2022-02-21 RX ORDER — DEXTROAMPHETAMINE SACCHARATE, AMPHETAMINE ASPARTATE, DEXTROAMPHETAMINE SULFATE AND AMPHETAMINE SULFATE 2.5; 2.5; 2.5; 2.5 MG/1; MG/1; MG/1; MG/1
TABLET ORAL
Qty: 30 TABLET | Refills: 0 | Status: SHIPPED | OUTPATIENT
Start: 2022-02-21 | End: 2022-02-23 | Stop reason: SDUPTHER

## 2022-02-22 RX ORDER — DEXTROAMPHETAMINE SACCHARATE, AMPHETAMINE ASPARTATE MONOHYDRATE, DEXTROAMPHETAMINE SULFATE AND AMPHETAMINE SULFATE 5; 5; 5; 5 MG/1; MG/1; MG/1; MG/1
20 CAPSULE, EXTENDED RELEASE ORAL EVERY MORNING
Qty: 30 CAPSULE | Refills: 0 | Status: CANCELLED | OUTPATIENT
Start: 2022-02-22
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
## 2022-02-23 ENCOUNTER — OFFICE VISIT (OUTPATIENT)
Dept: BEHAVIORAL HEALTH | Facility: CLINIC | Age: 22
End: 2022-02-23

## 2022-02-23 VITALS — HEIGHT: 60 IN | BODY MASS INDEX: 21.6 KG/M2 | WEIGHT: 110 LBS

## 2022-02-23 DIAGNOSIS — F33.0 MILD EPISODE OF RECURRENT MAJOR DEPRESSIVE DISORDER: Primary | ICD-10-CM

## 2022-02-23 DIAGNOSIS — F41.1 GAD (GENERALIZED ANXIETY DISORDER): ICD-10-CM

## 2022-02-23 DIAGNOSIS — F90.2 ATTENTION DEFICIT HYPERACTIVITY DISORDER, COMBINED TYPE: ICD-10-CM

## 2022-02-23 PROCEDURE — 99213 OFFICE O/P EST LOW 20 MIN: CPT | Performed by: NURSE PRACTITIONER

## 2022-02-23 RX ORDER — DEXTROAMPHETAMINE SACCHARATE, AMPHETAMINE ASPARTATE, DEXTROAMPHETAMINE SULFATE AND AMPHETAMINE SULFATE 2.5; 2.5; 2.5; 2.5 MG/1; MG/1; MG/1; MG/1
TABLET ORAL
Qty: 30 TABLET | Refills: 0 | Status: SHIPPED | OUTPATIENT
Start: 2022-02-23 | End: 2022-03-23 | Stop reason: SDUPTHER

## 2022-02-23 RX ORDER — HYDROXYZINE HYDROCHLORIDE 25 MG/1
TABLET, FILM COATED ORAL
Qty: 30 TABLET | Refills: 6 | Status: SHIPPED | OUTPATIENT
Start: 2022-02-23 | End: 2022-03-23

## 2022-02-23 RX ORDER — DEXTROAMPHETAMINE SACCHARATE, AMPHETAMINE ASPARTATE MONOHYDRATE, DEXTROAMPHETAMINE SULFATE AND AMPHETAMINE SULFATE 5; 5; 5; 5 MG/1; MG/1; MG/1; MG/1
20 CAPSULE, EXTENDED RELEASE ORAL EVERY MORNING
Qty: 30 CAPSULE | Refills: 0 | Status: SHIPPED | OUTPATIENT
Start: 2022-02-23 | End: 2022-04-06 | Stop reason: SDUPTHER

## 2022-02-23 RX ORDER — FLUOXETINE HYDROCHLORIDE 20 MG/1
20 CAPSULE ORAL DAILY
Qty: 14 CAPSULE | Refills: 0 | Status: SHIPPED | OUTPATIENT
Start: 2022-02-23 | End: 2022-03-23

## 2022-03-23 ENCOUNTER — OFFICE VISIT (OUTPATIENT)
Dept: BEHAVIORAL HEALTH | Facility: CLINIC | Age: 22
End: 2022-03-23

## 2022-03-23 VITALS
DIASTOLIC BLOOD PRESSURE: 70 MMHG | BODY MASS INDEX: 21.99 KG/M2 | SYSTOLIC BLOOD PRESSURE: 102 MMHG | HEIGHT: 60 IN | WEIGHT: 112 LBS

## 2022-03-23 DIAGNOSIS — F90.2 ATTENTION DEFICIT HYPERACTIVITY DISORDER, COMBINED TYPE: ICD-10-CM

## 2022-03-23 DIAGNOSIS — F33.0 MILD EPISODE OF RECURRENT MAJOR DEPRESSIVE DISORDER: Primary | ICD-10-CM

## 2022-03-23 DIAGNOSIS — F41.1 GAD (GENERALIZED ANXIETY DISORDER): ICD-10-CM

## 2022-03-23 PROCEDURE — 99213 OFFICE O/P EST LOW 20 MIN: CPT | Performed by: NURSE PRACTITIONER

## 2022-03-23 RX ORDER — DEXTROAMPHETAMINE SACCHARATE, AMPHETAMINE ASPARTATE, DEXTROAMPHETAMINE SULFATE AND AMPHETAMINE SULFATE 2.5; 2.5; 2.5; 2.5 MG/1; MG/1; MG/1; MG/1
TABLET ORAL
Qty: 30 TABLET | Refills: 0 | Status: SHIPPED | OUTPATIENT
Start: 2022-03-23 | End: 2022-04-25 | Stop reason: SDUPTHER

## 2022-03-23 NOTE — PROGRESS NOTES
Patient Name: Beatriz Rai  MRN: [id number]   :  2000     Chief Complaint:      ICD-10-CM ICD-9-CM   1. Mild episode of recurrent major depressive disorder (HCC)  F33.0 296.31   2. HARJEET (generalized anxiety disorder)  F41.1 300.02   3. Attention deficit hyperactivity disorder, combined type  F90.2 314.01       History of Present Illness: Beatriz Rai is a 22 y.o. female is here today for medication management follow up.  Patient stopped her Prozac and feels much better without it.  Is not sweating at night anymore.  Sleep has been good.    The following portions of the patient's history were reviewed and updated as appropriate: allergies, current medications, past family history, past medical history, past social history, past surgical history and problem list.    Review of Systems;;  Review of Systems   Constitutional: Negative for activity change, appetite change, fatigue, unexpected weight gain and unexpected weight loss.   Respiratory: Negative for shortness of breath and wheezing.    Gastrointestinal: Negative for constipation, diarrhea, nausea and vomiting.   Musculoskeletal: Negative for gait problem.   Skin: Negative for dry skin and rash.   Neurological: Negative for dizziness, speech difficulty, weakness, light-headedness, headache, memory problem and confusion.   Psychiatric/Behavioral: Negative for agitation, behavioral problems, decreased concentration, dysphoric mood, hallucinations, self-injury, sleep disturbance, suicidal ideas, negative for hyperactivity, depressed mood and stress. The patient is not nervous/anxious.        Physical Exam;;  Physical Exam  Vitals and nursing note reviewed.   Constitutional:       General: She is not in acute distress.     Appearance: She is well-developed. She is not diaphoretic.   HENT:      Head: Normocephalic and atraumatic.   Eyes:      Conjunctiva/sclera: Conjunctivae normal.   Cardiovascular:      Rate and Rhythm: Normal rate.   Pulmonary:  "     Effort: Pulmonary effort is normal. No respiratory distress.   Musculoskeletal:         General: Normal range of motion.      Cervical back: Full passive range of motion without pain and normal range of motion.   Skin:     General: Skin is warm and dry.   Neurological:      Mental Status: She is alert and oriented to person, place, and time.   Psychiatric:         Mood and Affect: Mood is not anxious or depressed. Affect is not labile, blunt, angry or inappropriate.         Speech: Speech is not rapid and pressured or tangential.         Behavior: Behavior normal. Behavior is not agitated, slowed, aggressive, withdrawn, hyperactive or combative. Behavior is cooperative.         Thought Content: Thought content normal. Thought content is not paranoid or delusional. Thought content does not include homicidal or suicidal ideation. Thought content does not include homicidal or suicidal plan.         Judgment: Judgment normal.       Blood pressure 102/70, height 152.4 cm (60\"), weight 50.8 kg (112 lb), unknown if currently breastfeeding.  Body mass index is 21.87 kg/m².    Current Medications;;    Current Outpatient Medications:   •  amphetamine-dextroamphetamine (ADDERALL) 10 MG tablet, One po q afternoon, Disp: 30 tablet, Rfl: 0  •  amphetamine-dextroamphetamine XR (Adderall XR) 20 MG 24 hr capsule, Take 1 capsule by mouth Every Morning, Disp: 30 capsule, Rfl: 0    Lab Results:   No visits with results within 3 Month(s) from this visit.   Latest known visit with results is:   Results Encounter on 07/31/2021   Component Date Value Ref Range Status   • WBC 08/09/2021 6.71  3.40 - 10.80 10*3/mm3 Final   • RBC 08/09/2021 5.18  3.77 - 5.28 10*6/mm3 Final   • Hemoglobin 08/09/2021 15.4  12.0 - 15.9 g/dL Final   • Hematocrit 08/09/2021 46.8 (A) 34.0 - 46.6 % Final   • MCV 08/09/2021 90.3  79.0 - 97.0 fL Final   • MCH 08/09/2021 29.7  26.6 - 33.0 pg Final   • MCHC 08/09/2021 32.9  31.5 - 35.7 g/dL Final   • RDW 08/09/2021 " 12.8  12.3 - 15.4 % Final   • Platelets 08/09/2021 347  140 - 450 10*3/mm3 Final   • Neutrophil Rel % 08/09/2021 53.2  42.7 - 76.0 % Final   • Lymphocyte Rel % 08/09/2021 36.2  19.6 - 45.3 % Final   • Monocyte Rel % 08/09/2021 6.6  5.0 - 12.0 % Final   • Eosinophil Rel % 08/09/2021 2.8  0.3 - 6.2 % Final   • Basophil Rel % 08/09/2021 0.9  0.0 - 1.5 % Final   • Neutrophils Absolute 08/09/2021 3.57  1.70 - 7.00 10*3/mm3 Final   • Lymphocytes Absolute 08/09/2021 2.43  0.70 - 3.10 10*3/mm3 Final   • Monocytes Absolute 08/09/2021 0.44  0.10 - 0.90 10*3/mm3 Final   • Eosinophils Absolute 08/09/2021 0.19  0.00 - 0.40 10*3/mm3 Final   • Basophils Absolute 08/09/2021 0.06  0.00 - 0.20 10*3/mm3 Final   • Immature Granulocyte Rel % 08/09/2021 0.3  0.0 - 0.5 % Final   • Immature Grans Absolute 08/09/2021 0.02  0.00 - 0.05 10*3/mm3 Final   • nRBC 08/09/2021 0.0  0.0 - 0.2 /100 WBC Final   • Iron 08/09/2021 38  37 - 145 mcg/dL Final   • Vitamin B-12 08/09/2021 652  211 - 946 pg/mL Final    Results may be falsely increased if patient taking Biotin.   • 25 Hydroxy, Vitamin D 08/09/2021 35.3  30.0 - 100.0 ng/ml Final    Comment: Results may be falsely increased if patient taking Biotin.  Reference Range for Total Vitamin D 25(OH)  Deficiency <20.0 ng/mL  Insufficiency 21-29 ng/mL  Sufficiency  ng/mL  Toxicity >100 ng/ml         Mental Status Exam:   Hygiene:   good  Cooperation:  Cooperative  Eye Contact:  Good  Psychomotor Behavior:  Appropriate  Mood:within normal limits  Affect:  Appropriate  Hopelessness: Denies  Speech:  Normal  Thought Process:  Goal directed  Thought Content:  Normal  Suicidal:  None  Homicidal:  None  Hallucinations:  None  Delusion:  None  Memory:  Intact  Orientation:  Person, Place, Time and Situation  Reliability:  good  Insight:  Good  Judgement:  Good  Impulse Control:  Good  Physical/Medical Issues:  No     PHQ-9 Depression Screening  Little interest or pleasure in doing things? 1-->several days    Feeling down, depressed, or hopeless? 1-->several days   Trouble falling or staying asleep, or sleeping too much? 1-->several days   Feeling tired or having little energy? 1-->several days   Poor appetite or overeating? 0-->not at all   Feeling bad about yourself - or that you are a failure or have let yourself or your family down? 0-->not at all   Trouble concentrating on things, such as reading the newspaper or watching television? 2-->more than half the days   Moving or speaking so slowly that other people could have noticed? Or the opposite - being so fidgety or restless that you have been moving around a lot more than usual? 1-->several days (Fidgety)   Thoughts that you would be better off dead, or of hurting yourself in some way? 0-->not at all   PHQ-9 Total Score 7   If you checked off any problems, how difficult have these problems made it for you to do your work, take care of things at home, or get along with other people? not difficult at all        Assessment/Plan:  Diagnoses and all orders for this visit:    1. Mild episode of recurrent major depressive disorder (HCC) (Primary)    2. HARJEET (generalized anxiety disorder)    3. Attention deficit hyperactivity disorder, combined type      Patient feeling better without Prozac.  We will continue extended release Adderall and short acting Adderall as ordered.    A psychological evaluation was conducted in order to assess past and current level of functioning. Areas assessed included, but were not limited to: perception of social support, perception of ability to face and deal with challenges in life (positive functioning), anxiety symptoms, depressive symptoms, perspective on beliefs/belief system, coping skills for stress, intelligence level,  Therapeutic rapport was established. Interventions conducted today were geared towards incorporating medication management along with support for continued therapy. Education was also provided as to the med management  with this provider and what to expect in subsequent sessions.    We discussed risks, benefits,goals and side effects of the above medication and the patient was agreeable with the plan.Patient was educated on the importance of compliance with treatment and follow-up appointments. Patient is aware to contact the [facility] with any worsening of symptoms. To call for questions or concerns and return early if necessary. Patent is agreeable to go to the Emergency Department or call 911 should they begin SI/HI.     Treatment Plan:   Discussed risks, benefits, and alternatives of medication. Encouraged healthy habits (eating, exercise and sleep). Call if any questions or problems arise. Medication reconciled. Controlled substance monitoring report reviewed. Provided psychoeducation.. Discussed coping strategies and current stressors. Set appropriate boundaries and limits for patient's well-being. Use distraction techniques to improve symptoms. Access support networks.      Return in about 3 months (around 6/23/2022) for Video visit, Follow Up 30 min.    AKIRA Olvera

## 2022-03-23 NOTE — TELEPHONE ENCOUNTER
Rx Refill Note  Requested Prescriptions     Pending Prescriptions Disp Refills   • amphetamine-dextroamphetamine (ADDERALL) 10 MG tablet 30 tablet 0     Sig: One po q afternoon      Last office visit with prescribing clinician: 2/23/2022      Next office visit with prescribing clinician: 3/23/2022            Maddie Glez MA  03/23/22, 13:30 EDT

## 2022-04-06 DIAGNOSIS — F90.2 ATTENTION DEFICIT HYPERACTIVITY DISORDER, COMBINED TYPE: ICD-10-CM

## 2022-04-06 RX ORDER — DEXTROAMPHETAMINE SACCHARATE, AMPHETAMINE ASPARTATE MONOHYDRATE, DEXTROAMPHETAMINE SULFATE AND AMPHETAMINE SULFATE 5; 5; 5; 5 MG/1; MG/1; MG/1; MG/1
20 CAPSULE, EXTENDED RELEASE ORAL EVERY MORNING
Qty: 30 CAPSULE | Refills: 0 | Status: SHIPPED | OUTPATIENT
Start: 2022-04-06 | End: 2022-05-06 | Stop reason: SDUPTHER

## 2022-04-25 DIAGNOSIS — F90.2 ATTENTION DEFICIT HYPERACTIVITY DISORDER, COMBINED TYPE: ICD-10-CM

## 2022-04-25 RX ORDER — DEXTROAMPHETAMINE SACCHARATE, AMPHETAMINE ASPARTATE, DEXTROAMPHETAMINE SULFATE AND AMPHETAMINE SULFATE 2.5; 2.5; 2.5; 2.5 MG/1; MG/1; MG/1; MG/1
TABLET ORAL
Qty: 30 TABLET | Refills: 0 | Status: SHIPPED | OUTPATIENT
Start: 2022-04-25 | End: 2022-06-16 | Stop reason: SDUPTHER

## 2022-04-25 NOTE — TELEPHONE ENCOUNTER
Rx Refill Note  Requested Prescriptions     Pending Prescriptions Disp Refills   • amphetamine-dextroamphetamine (ADDERALL) 10 MG tablet 30 tablet 0     Sig: One po q afternoon   • amphetamine-dextroamphetamine XR (Adderall XR) 20 MG 24 hr capsule 30 capsule 0     Sig: Take 1 capsule by mouth Every Morning      Last office visit with prescribing clinician: 3/23/2022      Next office visit with prescribing clinician: Visit date not found            Maddie Glez MA  04/25/22, 10:28 EDT

## 2022-05-06 DIAGNOSIS — F90.2 ATTENTION DEFICIT HYPERACTIVITY DISORDER, COMBINED TYPE: ICD-10-CM

## 2022-05-08 RX ORDER — DEXTROAMPHETAMINE SACCHARATE, AMPHETAMINE ASPARTATE MONOHYDRATE, DEXTROAMPHETAMINE SULFATE AND AMPHETAMINE SULFATE 5; 5; 5; 5 MG/1; MG/1; MG/1; MG/1
20 CAPSULE, EXTENDED RELEASE ORAL EVERY MORNING
Qty: 30 CAPSULE | Refills: 0 | Status: SHIPPED | OUTPATIENT
Start: 2022-05-08 | End: 2022-06-16 | Stop reason: SDUPTHER

## 2022-06-16 ENCOUNTER — TELEPHONE (OUTPATIENT)
Dept: FAMILY MEDICINE CLINIC | Facility: CLINIC | Age: 22
End: 2022-06-16

## 2022-06-16 DIAGNOSIS — F90.2 ATTENTION DEFICIT HYPERACTIVITY DISORDER, COMBINED TYPE: ICD-10-CM

## 2022-06-16 RX ORDER — DEXTROAMPHETAMINE SACCHARATE, AMPHETAMINE ASPARTATE, DEXTROAMPHETAMINE SULFATE AND AMPHETAMINE SULFATE 2.5; 2.5; 2.5; 2.5 MG/1; MG/1; MG/1; MG/1
TABLET ORAL
Qty: 30 TABLET | Refills: 0 | Status: SHIPPED | OUTPATIENT
Start: 2022-06-16 | End: 2022-08-08 | Stop reason: DRUGHIGH

## 2022-06-16 RX ORDER — DEXTROAMPHETAMINE SACCHARATE, AMPHETAMINE ASPARTATE MONOHYDRATE, DEXTROAMPHETAMINE SULFATE AND AMPHETAMINE SULFATE 5; 5; 5; 5 MG/1; MG/1; MG/1; MG/1
20 CAPSULE, EXTENDED RELEASE ORAL EVERY MORNING
Qty: 30 CAPSULE | Refills: 0 | Status: SHIPPED | OUTPATIENT
Start: 2022-06-16 | End: 2022-08-08 | Stop reason: DRUGHIGH

## 2022-06-27 ENCOUNTER — OFFICE VISIT (OUTPATIENT)
Dept: BEHAVIORAL HEALTH | Facility: CLINIC | Age: 22
End: 2022-06-27

## 2022-06-27 VITALS — HEIGHT: 60 IN | BODY MASS INDEX: 19.12 KG/M2 | WEIGHT: 97.4 LBS

## 2022-06-27 DIAGNOSIS — F33.0 MILD EPISODE OF RECURRENT MAJOR DEPRESSIVE DISORDER: ICD-10-CM

## 2022-06-27 DIAGNOSIS — F90.2 ATTENTION DEFICIT HYPERACTIVITY DISORDER, COMBINED TYPE: Primary | ICD-10-CM

## 2022-06-27 DIAGNOSIS — F41.1 GAD (GENERALIZED ANXIETY DISORDER): ICD-10-CM

## 2022-06-27 PROCEDURE — 99214 OFFICE O/P EST MOD 30 MIN: CPT

## 2022-06-27 RX ORDER — HYDROXYZINE HYDROCHLORIDE 25 MG/1
25 TABLET, FILM COATED ORAL 2 TIMES DAILY PRN
Qty: 60 TABLET | Refills: 0 | Status: SHIPPED | OUTPATIENT
Start: 2022-06-27 | End: 2022-08-08 | Stop reason: DRUGHIGH

## 2022-06-27 RX ORDER — DESVENLAFAXINE 25 MG/1
25 TABLET, EXTENDED RELEASE ORAL DAILY
Qty: 30 TABLET | Refills: 0 | Status: SHIPPED | OUTPATIENT
Start: 2022-06-27 | End: 2022-08-08 | Stop reason: SDUPTHER

## 2022-06-27 NOTE — PROGRESS NOTES
Follow Up Office Visit    Patient Name: Beatriz Rai  : 2000   MRN: [id number]   Care Team: Patient Care Team:  Tasha Hauser APRN as PCP - General (Family Medicine)  Mirella Mujica APRN as Nurse Practitioner (Nurse Practitioner)        Chief Complaint:    Chief Complaint   Patient presents with   • ADHD   • Anxiety   • Depression       History of Present Illness: Beatriz Rai is a 22 y.o. female who is here today for a medication management follow up. Patient states that things are going okay with her Adderall. She endorses a lot of situational stressors that has lead to an increase in racing thoughts, anxiety and depressive symptoms. Patient is wanting to get back on medication for her depression.     Subjective   Review of Systems:    Review of Systems   Constitutional: Positive for unexpected weight loss.   Psychiatric/Behavioral: Positive for depressed mood and stress. The patient is nervous/anxious.    All other systems reviewed and are negative.      Current Medications:   Current Outpatient Medications   Medication Sig Dispense Refill   • amphetamine-dextroamphetamine (ADDERALL) 10 MG tablet One po q afternoon 30 tablet 0   • amphetamine-dextroamphetamine XR (Adderall XR) 20 MG 24 hr capsule Take 1 capsule by mouth Every Morning 30 capsule 0   • Desvenlafaxine Succinate ER (Pristiq) 25 MG tablet sustained-release 24 hour Take 1 tablet by mouth Daily. 30 tablet 0   • hydrOXYzine (ATARAX) 25 MG tablet Take 1 tablet by mouth 2 (Two) Times a Day As Needed (anxiety and/or sleep). 60 tablet 0     No current facility-administered medications for this visit.       Mental Status Exam:   Hygiene:   good  Cooperation:  Cooperative  Eye Contact:  Good  Psychomotor Behavior:  Appropriate  Affect:  Appropriate  Mood: sad, depressed, anxious and fluctates  Speech:  Normal  Thought Process:  Goal directed and Linear  Thought Content:  Mood congruent  Suicidal:  None  Homicidal:   None  Hallucinations:  None  Delusion:  None  Memory:  Intact  Orientation:  Person, Place, Time and Situation  Reliability:  good  Insight:  Good  Judgement:  Good  Impulse Control:  Good  Physical/Medical Issues:  No      PHQ-2/PHQ-9: Depression Screening  Little Interest or Pleasure in Doing Things: 3-->nearly every day  PHQ-2 Total Score: 3  Trouble Falling or Staying Asleep, or Sleeping Too Much: 1-->several days  Feeling Tired or Having Little Energy: 1-->several days  Poor Appetite or Overeating: 3-->nearly every day  Feeling Bad about Yourself - or that You are a Failure or Have Let Yourself or Your Family Down: 3-->nearly every day  Trouble Concentrating on Things, Such as Reading the Newspaper or Watching Television: 1-->several days  Moving or Speaking So Slowly that Other People Could Have Noticed? Or the Opposite - Being So Fidgety: 0-->not at all  PHQ-9: Brief Depression Severity Measure Score: 12      PHQ-9 Score:   PHQ-9 Total Score: 12    Depression Screening:  Patient screened positive for depression based on a PHQ-9 score of 12 on 6/27/2022. Follow-up recommendations include: Suicide Risk Assessment performed.    HARJEET-7  Over the last two weeks, how often have you been bothered by the following problems?  Feeling nervous, anxious or on edge: Nearly every day  Not being able to stop or control worrying: Nearly every day  Worrying too much about different things: Nearly every day  Trouble Relaxing: Nearly every day  Being so restless that it is hard to sit still: Several days  Becoming easily annoyed or irritable: More than half the days  Feeling afraid as if something awful might happen: Nearly every day  HARJEET 7 Total Score: 18  If you checked any problems, how difficult have these problems made it for you to do your work, take care of things at home, or get along with other people: Extremely difficult    Screening for Adults With ADHD - (1-6)  1. How often do you have trouble wrapping up the final  "details of a project, once the challenging parts have been done?: Very Often  2. How often do you have difficulty getting things in order when you have to do a task that requires organization?: Very Often  3. How often do you have problems remembering appointments or obligations : Very Often  4. When you have a task that requires a lot of thought, how often do you avoid or delay getting started ?: Often  5. How often do you fidget or squirm with your hands or feet when you have to sit down for a long time?: Often  6. How often do you feel overly active and compelled to do things, like you were driven by a motor?: Rarely  7. How often do you make careless mistakes when you have to work on a boring or difficult project?: Sometimes  8. How often do have difficulty keeping your attention when you are doing boring or repetitive work?: Very Often  9. How often do you have difficulty concentrating on what people say to you, even when they are speaking to you: Very Often  10.How often do you misplace or have difficulty finding things at home or at work?: Very Often  11.How often are you distracted by activity or noise around you?: Very Often  12.How often do you leave your seat in meetings or other situations in which you are expected to remain seated?: Rarely  13.How often do you feel restless or fidgety?: Very Often  14.How often do you have difficulty unwinding and relaxing when you have time to yourself?: Very Often  15.How often do you find yourself talking too much when you are in social situations?: Sometimes  16.When you’re in a conversation, how often do you find yourself finishing the sentences of the people you are talking to, before they can finish them themselves?: Often  17.How often do you have difficulty waiting your turn in situations when turn taking is required?: Rarely  18.How often do you interrupt others when they are busy?: Often        Objective   Vital Signs:   Ht 152.4 cm (60\")   Wt 44.2 kg (97 lb " 6.4 oz)   BMI 19.02 kg/m²       Assessment / Plan    Diagnoses and all orders for this visit:    1. Attention deficit hyperactivity disorder, combined type (Primary)  -     Compliance Drug Analysis, Ur - Urine, Clean Catch; Future    2. Mild episode of recurrent major depressive disorder (HCC)  -     Desvenlafaxine Succinate ER (Pristiq) 25 MG tablet sustained-release 24 hour; Take 1 tablet by mouth Daily.  Dispense: 30 tablet; Refill: 0    3. HARJEET (generalized anxiety disorder)  -     hydrOXYzine (ATARAX) 25 MG tablet; Take 1 tablet by mouth 2 (Two) Times a Day As Needed (anxiety and/or sleep).  Dispense: 60 tablet; Refill: 0       Will continue Adderall as previously prescribed. Will start Pristiq 25 mg daily and Hydroxyzine 25 mg PRN for increased anxiety and sleep disturbances. Obtained yearly urine drug screen. Updated controlled substance agreement signed.     MEDS ORDERED DURING VISIT:  New Medications Ordered This Visit   Medications   • Desvenlafaxine Succinate ER (Pristiq) 25 MG tablet sustained-release 24 hour     Sig: Take 1 tablet by mouth Daily.     Dispense:  30 tablet     Refill:  0   • hydrOXYzine (ATARAX) 25 MG tablet     Sig: Take 1 tablet by mouth 2 (Two) Times a Day As Needed (anxiety and/or sleep).     Dispense:  60 tablet     Refill:  0         Follow Up   Return in about 6 weeks (around 8/8/2022).  Patient was given instructions and counseling regarding her condition or for health maintenance advice. Please see specific information pulled into the AVS if appropriate.     TREATMENT PLAN/GOALS: Continue supportive psychotherapy efforts and medications as indicated. Treatment and medication options discussed during today's visit. Patient acknowledged and verbally consented to continue with current treatment plan and was educated on the importance of compliance with treatment and follow-up appointments.    MEDICATION ISSUES:  Discussed medication options and treatment plan of prescribed  medication as well as the risks, benefits, and side effects including potential falls, possible impaired driving and metabolic adversities among others. Patient is agreeable to call the office with any worsening of symptoms or onset of side effects. Patient is agreeable to call 911 or go to the nearest ER should he/she begin having SI/HI.    [facility]IR[facility]  [address] [place] [address] [address] [place] [id number] [phone]    June 27, 2022 16:51 EDT

## 2022-07-04 LAB — DRUGS UR: NORMAL

## 2022-08-08 ENCOUNTER — OFFICE VISIT (OUTPATIENT)
Dept: BEHAVIORAL HEALTH | Facility: CLINIC | Age: 22
End: 2022-08-08

## 2022-08-08 VITALS
SYSTOLIC BLOOD PRESSURE: 112 MMHG | WEIGHT: 96.4 LBS | BODY MASS INDEX: 18.93 KG/M2 | HEIGHT: 60 IN | DIASTOLIC BLOOD PRESSURE: 70 MMHG

## 2022-08-08 DIAGNOSIS — F33.1 MAJOR DEPRESSIVE DISORDER, RECURRENT EPISODE, MODERATE: ICD-10-CM

## 2022-08-08 DIAGNOSIS — F41.1 GAD (GENERALIZED ANXIETY DISORDER): Primary | ICD-10-CM

## 2022-08-08 DIAGNOSIS — F90.2 ATTENTION DEFICIT HYPERACTIVITY DISORDER, COMBINED TYPE: ICD-10-CM

## 2022-08-08 DIAGNOSIS — F43.10 POST TRAUMATIC STRESS DISORDER (PTSD): ICD-10-CM

## 2022-08-08 PROCEDURE — 99214 OFFICE O/P EST MOD 30 MIN: CPT

## 2022-08-08 RX ORDER — DESVENLAFAXINE 25 MG/1
25 TABLET, EXTENDED RELEASE ORAL DAILY
Qty: 30 TABLET | Refills: 0 | Status: SHIPPED | OUTPATIENT
Start: 2022-08-08 | End: 2022-09-07 | Stop reason: DRUGHIGH
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
## 2022-09-07 ENCOUNTER — OFFICE VISIT (OUTPATIENT)
Dept: BEHAVIORAL HEALTH | Facility: CLINIC | Age: 22
End: 2022-09-07

## 2022-09-07 VITALS — BODY MASS INDEX: 19.2 KG/M2 | WEIGHT: 97.8 LBS | HEIGHT: 60 IN

## 2022-09-07 DIAGNOSIS — F41.1 GAD (GENERALIZED ANXIETY DISORDER): Primary | ICD-10-CM

## 2022-09-07 DIAGNOSIS — F90.2 ATTENTION DEFICIT HYPERACTIVITY DISORDER, COMBINED TYPE: ICD-10-CM

## 2022-09-07 DIAGNOSIS — F43.10 POST TRAUMATIC STRESS DISORDER (PTSD): ICD-10-CM

## 2022-09-07 DIAGNOSIS — F33.1 MAJOR DEPRESSIVE DISORDER, RECURRENT EPISODE, MODERATE: ICD-10-CM

## 2022-09-07 PROCEDURE — 99214 OFFICE O/P EST MOD 30 MIN: CPT

## 2022-09-07 RX ORDER — DESVENLAFAXINE SUCCINATE 50 MG/1
50 TABLET, EXTENDED RELEASE ORAL DAILY
Qty: 30 TABLET | Refills: 1 | Status: SHIPPED | OUTPATIENT
Start: 2022-09-07 | End: 2022-11-10 | Stop reason: SDUPTHER

## 2022-09-07 NOTE — PROGRESS NOTES
"  Follow Up Office Visit    Patient Name: Beatriz Rai  : 2000   MRN: [id number]   Care Team: Patient Care Team:  Tasha Hauser APRN as PCP - General (Family Medicine)  Kelly Irizarry APRN as Nurse Practitioner (Behavioral Health)        Chief Complaint:    Chief Complaint   Patient presents with   • ADHD   • Anxiety   • Med Management   • PTSD       History of Present Illness: Beatriz Rai is a 22 y.o. female who is here today for a medication management follow up. Patient states that she has tolerated Pristiq well but does not feel much of a difference as far as her mood goes. She endorses still feeling depressed. Patient denies SI/HI today.     Subjective   Review of Systems:    Review of Systems   Psychiatric/Behavioral: Positive for depressed mood.   All other systems reviewed and are negative.      Current Medications:   Current Outpatient Medications   Medication Sig Dispense Refill   • desvenlafaxine (Pristiq) 50 MG 24 hr tablet Take 1 tablet by mouth Daily. 30 tablet 1     No current facility-administered medications for this visit.       Mental Status Exam:   Hygiene:   good  Cooperation:  Cooperative  Eye Contact:  Good  Psychomotor Behavior:  Appropriate  Affect:  Appropriate  Mood: normal  Speech:  Normal  Thought Process:  Goal directed and Linear  Thought Content:  Normal  Suicidal:  None  Homicidal:  None  Hallucinations:  None  Delusion:  None  Memory:  Intact  Orientation:  Person, Place, Time and Situation  Reliability:  good  Insight:  Good  Judgement:  Good  Impulse Control:  Good  Physical/Medical Issues:  No      Objective   Vital Signs:   Ht 152.4 cm (60\")   Wt 44.4 kg (97 lb 12.8 oz)   BMI 19.10 kg/m²       Assessment / Plan    Diagnoses and all orders for this visit:    1. HARJEET (generalized anxiety disorder) (Primary)  -     desvenlafaxine (Pristiq) 50 MG 24 hr tablet; Take 1 tablet by mouth Daily.  Dispense: 30 tablet; Refill: 1    2. Major " depressive disorder, recurrent episode, moderate (HCC)  -     desvenlafaxine (Pristiq) 50 MG 24 hr tablet; Take 1 tablet by mouth Daily.  Dispense: 30 tablet; Refill: 1    3. Post traumatic stress disorder (PTSD)  -     desvenlafaxine (Pristiq) 50 MG 24 hr tablet; Take 1 tablet by mouth Daily.  Dispense: 30 tablet; Refill: 1    4. Attention deficit hyperactivity disorder, combined type       Will increase Pristiq to 50mg.     A psychological evaluation was conducted in order to assess past and current level of functioning. Areas assessed included, but were not limited to: perception of social support, perception of ability to face and deal with challenges in life (positive functioning), anxiety symptoms, depressive symptoms, perspective on beliefs/belief system, coping skills for stress, intelligence level,  Therapeutic rapport was established. Interventions conducted today were geared towards incorporating medication management along with support for continued therapy. Education was also provided as to the med management with this provider and what to expect in subsequent sessions.      We discussed risks, benefits, goals and side effects of the above medication and the patient was agreeable with the plan. Patient was educated on the importance of compliance with treatment and follow-up appointments. Patient is aware to contact the [facility] with any worsening of symptoms. To call for questions or concerns and return early if necessary. Patent is agreeable to go to the Emergency Department or call 911 should they begin SI/HI.    PHQ-2/PHQ-9: Depression Screening  Little Interest or Pleasure in Doing Things: 2-->more than half the days  Feeling Down, Depressed or Hopeless: 2-->more than half the days  PHQ-2 Total Score: 4  Trouble Falling or Staying Asleep, or Sleeping Too Much: 3-->nearly every day  Feeling Tired or Having Little Energy: 3-->nearly every day  Poor Appetite or Overeatin-->more than half the  days  Feeling Bad about Yourself - or that You are a Failure or Have Let Yourself or Your Family Down: 3-->nearly every day  Trouble Concentrating on Things, Such as Reading the Newspaper or Watching Television: 3-->nearly every day  Moving or Speaking So Slowly that Other People Could Have Noticed? Or the Opposite - Being So Fidgety: 1-->several days  Thoughts that You Would be Better Off Dead or of Hurting Yourself in Some Way: 0-->not at all  PHQ-9: Brief Depression Severity Measure Score: 19  If You Checked Off Any Problems, How Difficult Have These Problems Made It For You to Do Your Work, Take Care of Things at Home, or Get Along with Other People?: very difficult    PHQ-9 Score:   PHQ-9 Total Score: 19    Depression Screening:  Patient screened positive for depression based on a PHQ-9 score of 19 on 9/7/2022. Follow-up recommendations include: Prescribed antidepressant medication treatment and Suicide Risk Assessment performed.    Over the last two weeks, how often have you been bothered by the following problems?  Feeling nervous, anxious or on edge: Several days  Not being able to stop or control worrying: Several days  Worrying too much about different things: More than half the days  Trouble Relaxing: Several days  Being so restless that it is hard to sit still: Not at all  Becoming easily annoyed or irritable: More than half the days  Feeling afraid as if something awful might happen: More than half the days  HARJEET 7 Total Score: 9  If you checked any problems, how difficult have these problems made it for you to do your work, take care of things at home, or get along with other people: Very difficult    Screening for Adults With ADHD - (1-6)  1. How often do you have trouble wrapping up the final details of a project, once the challenging parts have been done?: Often  2. How often do you have difficulty getting things in order when you have to do a task that requires organization?: Very Often  3. How  often do you have problems remembering appointments or obligations : Sometimes  4. When you have a task that requires a lot of thought, how often do you avoid or delay getting started ?: Often  5. How often do you fidget or squirm with your hands or feet when you have to sit down for a long time?: Sometimes  6. How often do you feel overly active and compelled to do things, like you were driven by a motor?: Never  7. How often do you make careless mistakes when you have to work on a boring or difficult project?: Sometimes  8. How often do have difficulty keeping your attention when you are doing boring or repetitive work?: Sometimes  9. How often do you have difficulty concentrating on what people say to you, even when they are speaking to you: Very Often  10.How often do you misplace or have difficulty finding things at home or at work?: Often  11.How often are you distracted by activity or noise around you?: Sometimes  12.How often do you leave your seat in meetings or other situations in which you are expected to remain seated?: Rarely  13.How often do you feel restless or fidgety?: Rarely  14.How often do you have difficulty unwinding and relaxing when you have time to yourself?: Sometimes  15.How often do you find yourself talking too much when you are in social situations?: Rarely  16.When you’re in a conversation, how often do you find yourself finishing the sentences of the people you are talking to, before they can finish them themselves?: Rarely  17.How often do you have difficulty waiting your turn in situations when turn taking is required?: Rarely  18.How often do you interrupt others when they are busy?: Rarely      MEDS ORDERED DURING VISIT:  New Medications Ordered This Visit   Medications   • desvenlafaxine (Pristiq) 50 MG 24 hr tablet     Sig: Take 1 tablet by mouth Daily.     Dispense:  30 tablet     Refill:  1         Follow Up   Return in about 8 weeks (around 11/2/2022).  Patient was given  instructions and counseling regarding her condition or for health maintenance advice. Please see specific information pulled into the AVS if appropriate.     TREATMENT PLAN/GOALS: Continue supportive psychotherapy efforts and medications as indicated. Treatment and medication options discussed during today's visit. Patient acknowledged and verbally consented to continue with current treatment plan and was educated on the importance of compliance with treatment and follow-up appointments.    MEDICATION ISSUES:  Discussed medication options and treatment plan of prescribed medication as well as the risks, benefits, and side effects including potential falls, possible impaired driving and metabolic adversities among others. Patient is agreeable to call the office with any worsening of symptoms or onset of side effects. Patient is agreeable to call 911 or go to the nearest ER should he/she begin having SI/HI.      [facility] [place]  [address] [place] [address]  [phone]    September 7, 2022 14:41 EDT

## 2022-10-26 ENCOUNTER — OFFICE VISIT (OUTPATIENT)
Dept: BEHAVIORAL HEALTH | Facility: CLINIC | Age: 22
End: 2022-10-26

## 2022-10-26 VITALS — HEIGHT: 60 IN | WEIGHT: 102.4 LBS | BODY MASS INDEX: 20.1 KG/M2

## 2022-10-26 DIAGNOSIS — F41.1 GENERALIZED ANXIETY DISORDER: Primary | ICD-10-CM

## 2022-10-26 PROCEDURE — 90834 PSYTX W PT 45 MINUTES: CPT | Performed by: COUNSELOR
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
## 2022-11-10 DIAGNOSIS — F41.1 GAD (GENERALIZED ANXIETY DISORDER): ICD-10-CM

## 2022-11-10 DIAGNOSIS — F43.10 POST TRAUMATIC STRESS DISORDER (PTSD): ICD-10-CM

## 2022-11-10 DIAGNOSIS — F33.1 MAJOR DEPRESSIVE DISORDER, RECURRENT EPISODE, MODERATE: ICD-10-CM

## 2022-11-14 RX ORDER — DESVENLAFAXINE SUCCINATE 50 MG/1
50 TABLET, EXTENDED RELEASE ORAL DAILY
Qty: 30 TABLET | Refills: 2 | Status: SHIPPED | OUTPATIENT
Start: 2022-11-14

## 2022-12-12 DIAGNOSIS — F33.1 MAJOR DEPRESSIVE DISORDER, RECURRENT EPISODE, MODERATE: ICD-10-CM

## 2022-12-12 DIAGNOSIS — F41.1 GAD (GENERALIZED ANXIETY DISORDER): ICD-10-CM

## 2022-12-12 DIAGNOSIS — F43.10 POST TRAUMATIC STRESS DISORDER (PTSD): ICD-10-CM

## 2022-12-12 RX ORDER — DESVENLAFAXINE SUCCINATE 50 MG/1
50 TABLET, EXTENDED RELEASE ORAL DAILY
Qty: 30 TABLET | Refills: 2 | OUTPATIENT
Start: 2022-12-12

## 2023-01-25 ENCOUNTER — OFFICE VISIT (OUTPATIENT)
Dept: BEHAVIORAL HEALTH | Facility: CLINIC | Age: 23
End: 2023-01-25
Payer: COMMERCIAL

## 2023-01-25 VITALS — WEIGHT: 107.4 LBS | BODY MASS INDEX: 21.09 KG/M2 | HEIGHT: 60 IN

## 2023-01-25 DIAGNOSIS — F41.1 GENERALIZED ANXIETY DISORDER: Primary | ICD-10-CM

## 2023-01-25 DIAGNOSIS — F33.0 MILD EPISODE OF RECURRENT MAJOR DEPRESSIVE DISORDER: ICD-10-CM

## 2023-01-25 PROCEDURE — 90834 PSYTX W PT 45 MINUTES: CPT | Performed by: COUNSELOR
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
## 2023-04-14 DIAGNOSIS — F33.1 MAJOR DEPRESSIVE DISORDER, RECURRENT EPISODE, MODERATE: ICD-10-CM

## 2023-04-14 DIAGNOSIS — F41.1 GAD (GENERALIZED ANXIETY DISORDER): ICD-10-CM

## 2023-04-14 DIAGNOSIS — F43.10 POST TRAUMATIC STRESS DISORDER (PTSD): ICD-10-CM

## 2023-04-14 RX ORDER — DESVENLAFAXINE SUCCINATE 50 MG/1
50 TABLET, EXTENDED RELEASE ORAL DAILY
Qty: 30 TABLET | Refills: 1 | OUTPATIENT
Start: 2023-04-14

## 2023-04-17 RX ORDER — DESVENLAFAXINE SUCCINATE 50 MG/1
50 TABLET, EXTENDED RELEASE ORAL DAILY
Qty: 30 TABLET | Refills: 2 | Status: SHIPPED | OUTPATIENT
Start: 2023-04-17

## 2023-04-17 NOTE — TELEPHONE ENCOUNTER
Rx Refill Note  Requested Prescriptions     Pending Prescriptions Disp Refills   • desvenlafaxine (Pristiq) 50 MG 24 hr tablet 30 tablet 2     Sig: Take 1 tablet by mouth Daily.      Last office visit with prescribing clinician: 9/7/2022   Last telemedicine visit with prescribing clinician: 4/14/2023   Next office visit with prescribing clinician: 4/14/2023                         Would you like a call back once the refill request has been completed: [] Yes [] No    If the office needs to give you a call back, can they leave a voicemail: [] Yes [] No    Yeimi Azul MA  04/17/23, 09:01 EDT

## 2023-04-17 NOTE — TELEPHONE ENCOUNTER
Patient sent request when we were out on Friday 4/14/2023. [facility] will not be back in office until tomorrow 4/18/2023. I sent the request to [facility] this morning.

## 2023-04-17 NOTE — TELEPHONE ENCOUNTER
Patient called and said pharmacy told her sangeetha would not fill pristiq until she sees sangeetha again and appt in is June and is neediung medicine has been out for a few days

## 2023-06-07 ENCOUNTER — OFFICE VISIT (OUTPATIENT)
Dept: BEHAVIORAL HEALTH | Facility: CLINIC | Age: 23
End: 2023-06-07
Payer: COMMERCIAL

## 2023-06-07 VITALS — WEIGHT: 110 LBS | HEIGHT: 60 IN | BODY MASS INDEX: 21.6 KG/M2

## 2023-06-07 DIAGNOSIS — F41.1 GAD (GENERALIZED ANXIETY DISORDER): ICD-10-CM

## 2023-06-07 DIAGNOSIS — F43.10 POST TRAUMATIC STRESS DISORDER (PTSD): ICD-10-CM

## 2023-06-07 DIAGNOSIS — F33.1 MAJOR DEPRESSIVE DISORDER, RECURRENT EPISODE, MODERATE: ICD-10-CM

## 2023-06-07 PROCEDURE — 99214 OFFICE O/P EST MOD 30 MIN: CPT

## 2023-06-07 RX ORDER — DESVENLAFAXINE SUCCINATE 50 MG/1
50 TABLET, EXTENDED RELEASE ORAL DAILY
Qty: 90 TABLET | Refills: 1 | Status: SHIPPED | OUTPATIENT
Start: 2023-06-07

## 2023-06-07 NOTE — PROGRESS NOTES
"  Follow Up Office Visit    Patient Name: Beatriz Rai  : 2000   MRN: [id number]   Care Team: Patient Care Team:  Tasha Hauser APRN as PCP - General (Family Medicine)  Kelly Irizarry APRN as Nurse Practitioner (Behavioral Health)         Chief Complaint:    Chief Complaint   Patient presents with    Anxiety    Depression    PTSD    Med Management       History of Present Illness: Beatriz Rai is a 23 y.o. female who is here today for a medication management follow up. Patient reports that the Pristiq continues to be effective. She states she noticed an improvement after increasing it. She feels that her anxiety and mood are managed well and did not see the need to make any changes at today's visit. Patient did not have any medication concerns at today's visit.     The following portion of the patient's history were reviewed and updated appropriately: allergies, current and past medications, family history, medical history and social history.  Subjective   Review of Systems:    Review of Systems   All other systems reviewed and are negative.    Current Medications:   Current Outpatient Medications   Medication Sig Dispense Refill    desvenlafaxine (Pristiq) 50 MG 24 hr tablet Take 1 tablet by mouth Daily. 90 tablet 1     No current facility-administered medications for this visit.       Mental Status Exam:   Hygiene:   good  Cooperation:  Cooperative  Eye Contact:  Good  Psychomotor Behavior:  Appropriate  Affect:  Appropriate  Mood: normal  Speech:  Normal  Thought Process:  Goal directed and Linear  Thought Content:  Normal and Mood congruent  Suicidal:  None  Homicidal:  None  Hallucinations:  None  Delusion:  None  Memory:  Intact  Orientation:  Person, Place, Time, and Situation  Reliability:  good  Insight:  Good  Judgement:  Good  Impulse Control:  Good  Physical/Medical Issues:  No      Objective   Vital Signs:   Ht 152.4 cm (60\")   Wt 49.9 kg (110 lb)   BMI " 21.48 kg/m²       Assessment / Plan    Diagnoses and all orders for this visit:    1. Major depressive disorder, recurrent episode, moderate  -     desvenlafaxine (Pristiq) 50 MG 24 hr tablet; Take 1 tablet by mouth Daily.  Dispense: 90 tablet; Refill: 1    2. HARJEET (generalized anxiety disorder)  -     desvenlafaxine (Pristiq) 50 MG 24 hr tablet; Take 1 tablet by mouth Daily.  Dispense: 90 tablet; Refill: 1    3. Post traumatic stress disorder (PTSD)  -     desvenlafaxine (Pristiq) 50 MG 24 hr tablet; Take 1 tablet by mouth Daily.  Dispense: 90 tablet; Refill: 1       Patient appears to be doing well on current medication. No issues reported and no indication for change. Will continue medication as ordered.      A psychological evaluation was conducted in order to assess past and current level of functioning. Areas assessed included, but were not limited to: perception of social support, perception of ability to face and deal with challenges in life (positive functioning), anxiety symptoms, depressive symptoms, perspective on beliefs/belief system, coping skills for stress, intelligence level,  Therapeutic rapport was established. Interventions conducted today were geared towards incorporating medication management along with support for continued therapy. Education was also provided as to the med management with this provider and what to expect in subsequent sessions.      We discussed risks, benefits, goals and side effects of the above medication and the patient was agreeable with the plan. Patient was educated on the importance of compliance with treatment and follow-up appointments. Patient is aware to contact the [facility] with any worsening of symptoms. To call for questions or concerns and return early if necessary. Patent is agreeable to go to the Emergency Department or call 911 should they begin SI/HI.      PHQ-2/PHQ-9: Depression Screening  Little Interest or Pleasure in Doing Things: 0-->not at  all  Feeling Down, Depressed or Hopeless: 0-->not at all  PHQ-2 Total Score: 0  PHQ-9: Brief Depression Severity Measure Score: 0      PHQ-9 Score:   PHQ-9 Total Score: 0        Over the last two weeks, how often have you been bothered by the following problems?  Feeling nervous, anxious or on edge: Not at all  Not being able to stop or control worrying: Not at all  Worrying too much about different things: Not at all  Trouble Relaxing: Not at all  Being so restless that it is hard to sit still: Not at all  Becoming easily annoyed or irritable: Not at all  Feeling afraid as if something awful might happen: Not at all  HARJEET 7 Total Score: 0  If you checked any problems, how difficult have these problems made it for you to do your work, take care of things at home, or get along with other people: Not difficult at all        Screening for Adults With ADHD - (1-6)  1. How often do you have trouble wrapping up the final details of a project, once the challenging parts have been done?: Sometimes  2. How often do you have difficulty getting things in order when you have to do a task that requires organization?: Sometimes  3. How often do you have problems remembering appointments or obligations : Sometimes  4. When you have a task that requires a lot of thought, how often do you avoid or delay getting started ?: Often  5. How often do you fidget or squirm with your hands or feet when you have to sit down for a long time?: Sometimes  6. How often do you feel overly active and compelled to do things, like you were driven by a motor?: Never  7. How often do you make careless mistakes when you have to work on a boring or difficult project?: Rarely  8. How often do have difficulty keeping your attention when you are doing boring or repetitive work?: Often  9. How often do you have difficulty concentrating on what people say to you, even when they are speaking to you: Sometimes  10.How often do you misplace or have difficulty  finding things at home or at work?: Sometimes  11.How often are you distracted by activity or noise around you?: Rarely  12.How often do you leave your seat in meetings or other situations in which you are expected to remain seated?: Never  13.How often do you feel restless or fidgety?: Rarely  14.How often do you have difficulty unwinding and relaxing when you have time to yourself?: Rarely  15.How often do you find yourself talking too much when you are in social situations?: Never  16.When you’re in a conversation, how often do you find yourself finishing the sentences of the people you are talking to, before they can finish them themselves?: Often  17.How often do you have difficulty waiting your turn in situations when turn taking is required?: Never  18.How often do you interrupt others when they are busy?: Sometimes        MEDS ORDERED DURING VISIT:  New Medications Ordered This Visit   Medications    desvenlafaxine (Pristiq) 50 MG 24 hr tablet     Sig: Take 1 tablet by mouth Daily.     Dispense:  90 tablet     Refill:  1         Follow Up   Return in about 6 months (around 12/7/2023).  Patient was given instructions and counseling regarding her condition or for health maintenance advice. Please see specific information pulled into the AVS if appropriate.     TREATMENT PLAN/GOALS: Continue supportive psychotherapy efforts and medications as indicated. Treatment and medication options discussed during today's visit. Patient acknowledged and verbally consented to continue with current treatment plan and was educated on the importance of compliance with treatment and follow-up appointments.    MEDICATION ISSUES:  Discussed medication options and treatment plan of prescribed medication as well as the risks, benefits, and side effects including potential falls, possible impaired driving and metabolic adversities among others. Patient is agreeable to call the office with any worsening of symptoms or onset of side  effects. Patient is agreeable to call 911 or go to the nearest ER should he/she begin having SI/HI.        AKIRA ArmstrongE PC [facility] [place] [id number] [place] [id number] [phone]    June 7, 2023 11:45 EDT

## 2023-10-27 ENCOUNTER — TELEPHONE (OUTPATIENT)
Dept: FAMILY MEDICINE CLINIC | Facility: CLINIC | Age: 23
End: 2023-10-27

## 2023-10-27 NOTE — TELEPHONE ENCOUNTER
Caller: Beatriz Rai    Relationship: Self    Best call back number: [phone]     Caller requesting test results: LABS     What test was performed: LABS     When was the test performed: 10.24-10.25?    Where was the test performed: OFFICE     Additional notes: PLEASE CALL WITH RESULTS

## 2024-01-22 DIAGNOSIS — F33.1 MAJOR DEPRESSIVE DISORDER, RECURRENT EPISODE, MODERATE: ICD-10-CM

## 2024-01-22 DIAGNOSIS — F41.1 GAD (GENERALIZED ANXIETY DISORDER): ICD-10-CM

## 2024-01-22 DIAGNOSIS — F43.10 POST TRAUMATIC STRESS DISORDER (PTSD): ICD-10-CM

## 2024-01-22 RX ORDER — DESVENLAFAXINE SUCCINATE 50 MG/1
50 TABLET, EXTENDED RELEASE ORAL DAILY
Qty: 30 TABLET | Refills: 0 | Status: SHIPPED | OUTPATIENT
Start: 2024-01-22

## 2024-02-23 DIAGNOSIS — F43.10 POST TRAUMATIC STRESS DISORDER (PTSD): ICD-10-CM

## 2024-02-23 DIAGNOSIS — F41.1 GAD (GENERALIZED ANXIETY DISORDER): ICD-10-CM

## 2024-02-23 DIAGNOSIS — F33.1 MAJOR DEPRESSIVE DISORDER, RECURRENT EPISODE, MODERATE: ICD-10-CM

## 2024-02-26 RX ORDER — DESVENLAFAXINE SUCCINATE 50 MG/1
50 TABLET, EXTENDED RELEASE ORAL DAILY
Qty: 30 TABLET | Refills: 0 | Status: SHIPPED | OUTPATIENT
Start: 2024-02-26

## 2024-03-06 ENCOUNTER — OFFICE VISIT (OUTPATIENT)
Dept: BEHAVIORAL HEALTH | Facility: CLINIC | Age: 24
End: 2024-03-06
Payer: COMMERCIAL

## 2024-03-06 VITALS
HEIGHT: 60 IN | SYSTOLIC BLOOD PRESSURE: 106 MMHG | HEART RATE: 82 BPM | DIASTOLIC BLOOD PRESSURE: 68 MMHG | OXYGEN SATURATION: 96 % | BODY MASS INDEX: 21.99 KG/M2 | WEIGHT: 112 LBS

## 2024-03-06 DIAGNOSIS — F43.10 POST TRAUMATIC STRESS DISORDER (PTSD): ICD-10-CM

## 2024-03-06 DIAGNOSIS — F41.1 GAD (GENERALIZED ANXIETY DISORDER): Primary | ICD-10-CM

## 2024-03-06 DIAGNOSIS — F33.1 MAJOR DEPRESSIVE DISORDER, RECURRENT EPISODE, MODERATE: ICD-10-CM

## 2024-03-06 PROCEDURE — 99214 OFFICE O/P EST MOD 30 MIN: CPT

## 2024-03-06 RX ORDER — DESVENLAFAXINE SUCCINATE 50 MG/1
50 TABLET, EXTENDED RELEASE ORAL DAILY
Qty: 90 TABLET | Refills: 0 | Status: SHIPPED | OUTPATIENT
Start: 2024-03-06

## 2024-03-06 NOTE — PROGRESS NOTES
Follow Up Office Visit    Patient Name: Beatriz Rai  : 2000   MRN: [id number]   Care Team: Patient Care Team:  Tasha Hauser APRN as PCP - General (Family Medicine)  Kelly Irizarry APRN as Nurse Practitioner (Behavioral Health)         Chief Complaint:    Chief Complaint   Patient presents with    Anxiety    Depression    PTSD    Med Management       History of Present Illness: Beatriz Rai is a 24 y.o. female who is here today for a medication management follow up. Patient reports that overall medication continues to be effective. She feels that her anxiety and mood have managed well. She does endorse some increased anxiety at times, however, she feels that it is much more manageable than it used to be. She did not see the need to make any changes and did not have any medication concerns at today's visit. She denies SI/HI today.     The following portion of the patient's history were reviewed and updated appropriately: allergies, current and past medications, family history, medical history and social history.  Subjective   Review of Systems:    Review of Systems   Respiratory: Negative.     Cardiovascular: Negative.    Neurological: Negative.    Psychiatric/Behavioral:  The patient is nervous/anxious.    All other systems reviewed and are negative.      Current Medications:   Current Outpatient Medications   Medication Sig Dispense Refill    desvenlafaxine (Pristiq) 50 MG 24 hr tablet Take 1 tablet by mouth Daily. 90 tablet 0     No current facility-administered medications for this visit.       Mental Status Exam:   Hygiene:   good  Cooperation:  Cooperative  Eye Contact:  Good  Psychomotor Behavior:  Appropriate  Affect:  Appropriate  Mood: normal  Speech:  Normal  Thought Process:  Goal directed and Linear  Thought Content:  Normal and Mood congruent  Suicidal:  None  Homicidal:  None  Hallucinations:  None  Delusion:  None  Memory:  Intact  Orientation:  Person,  "Place, Time, and Situation  Reliability:  good  Insight:  Good  Judgement:  Good  Impulse Control:  Good  Physical/Medical Issues:  No      Objective   Vital Signs:   /68   Pulse 82   Ht 152.4 cm (60\")   Wt 50.8 kg (112 lb)   SpO2 96%   BMI 21.87 kg/m²       Assessment / Plan    Diagnoses and all orders for this visit:    1. HARJEET (generalized anxiety disorder) (Primary)  -     desvenlafaxine (Pristiq) 50 MG 24 hr tablet; Take 1 tablet by mouth Daily.  Dispense: 90 tablet; Refill: 0    2. Major depressive disorder, recurrent episode, moderate  -     desvenlafaxine (Pristiq) 50 MG 24 hr tablet; Take 1 tablet by mouth Daily.  Dispense: 90 tablet; Refill: 0    3. Post traumatic stress disorder (PTSD)  -     desvenlafaxine (Pristiq) 50 MG 24 hr tablet; Take 1 tablet by mouth Daily.  Dispense: 90 tablet; Refill: 0       Patient appears to be doing well on current medication. No issues reported and no indication for change. Will continue medication as ordered.     PHQ-9  PHQ-2/PHQ-9: Depression Screening  Little Interest or Pleasure in Doing Things: 1-->several days  Feeling Down, Depressed or Hopeless: 1-->several days  PHQ-2 Total Score: 2  Trouble Falling or Staying Asleep, or Sleeping Too Much: 1-->several days  Feeling Tired or Having Little Energy: 1-->several days  Poor Appetite or Overeatin-->not at all  Feeling Bad about Yourself - or that You are a Failure or Have Let Yourself or Your Family Down: 0-->not at all  Trouble Concentrating on Things, Such as Reading the Newspaper or Watching Television: 0-->not at all  Moving or Speaking So Slowly that Other People Could Have Noticed? Or the Opposite - Being So Fidgety: 0-->not at all  Thoughts that You Would be Better Off Dead or of Hurting Yourself in Some Way: 0-->not at all  PHQ-9: Brief Depression Severity Measure Score: 4  If You Checked Off Any Problems, How Difficult Have These Problems Made It For You to Do Your Work, Take Care of Things at " Home, or Get Along with Other People?: somewhat difficult      PHQ-9 Score:   PHQ-9 Total Score: 4    Depression Screening:  Patient screened positive for depression based on a PHQ-9 score of 4 on 3/6/2024. Follow-up recommendations include: Prescribed antidepressant medication treatment and Suicide Risk Assessment performed.        HARJEET-7  Over the last two weeks, how often have you been bothered by the following problems?  Feeling nervous, anxious or on edge: More than half the days  Not being able to stop or control worrying: Several days  Worrying too much about different things: Several days  Trouble Relaxing: Not at all  Being so restless that it is hard to sit still: Not at all  Becoming easily annoyed or irritable: Several days  Feeling afraid as if something awful might happen: Several days  HARJEET 7 Total Score: 6  If you checked any problems, how difficult have these problems made it for you to do your work, take care of things at home, or get along with other people: Somewhat difficult      ADHD  Screening for Adults With ADHD - (1-6)  1. How often do you have trouble wrapping up the final details of a project, once the challenging parts have been done?: Rarely  2. How often do you have difficulty getting things in order when you have to do a task that requires organization?: Rarely  3. How often do you have problems remembering appointments or obligations : Sometimes  4. When you have a task that requires a lot of thought, how often do you avoid or delay getting started ?: Rarely  5. How often do you fidget or squirm with your hands or feet when you have to sit down for a long time?: Rarely  6. How often do you feel overly active and compelled to do things, like you were driven by a motor?: Never  7. How often do you make careless mistakes when you have to work on a boring or difficult project?: Rarely  8. How often do have difficulty keeping your attention when you are doing boring or repetitive work?:  Rarely  9. How often do you have difficulty concentrating on what people say to you, even when they are speaking to you: Rarely  10.How often do you misplace or have difficulty finding things at home or at work?: Sometimes  11.How often are you distracted by activity or noise around you?: Rarely  12.How often do you leave your seat in meetings or other situations in which you are expected to remain seated?: Never  13.How often do you feel restless or fidgety?: Never  14.How often do you have difficulty unwinding and relaxing when you have time to yourself?: Never  15.How often do you find yourself talking too much when you are in social situations?: Never  16.When you’re in a conversation, how often do you find yourself finishing the sentences of the people you are talking to, before they can finish them themselves?: Rarely  17.How often do you have difficulty waiting your turn in situations when turn taking is required?: Never  18.How often do you interrupt others when they are busy?: Rarely    A psychological evaluation was conducted in order to assess past and current level of functioning. Areas assessed included, but were not limited to: perception of social support, perception of ability to face and deal with challenges in life (positive functioning), anxiety symptoms, depressive symptoms, perspective on beliefs/belief system, coping skills for stress, intelligence level,  Therapeutic rapport was established. Interventions conducted today were geared towards incorporating medication management along with support for continued therapy. Education was also provided as to the med management with this provider and what to expect in subsequent sessions.      We discussed risks, benefits, goals and side effects of the above medication and the patient was agreeable with the plan. Patient was educated on the importance of compliance with treatment and follow-up appointments. Patient is aware to contact the [facility] with any worsening of symptoms. To call for questions or concerns and return early if necessary. Patent is agreeable to go to the Emergency Department or call 911 should they begin SI/HI.    MEDS ORDERED DURING VISIT:  New Medications Ordered This Visit   Medications    desvenlafaxine (Pristiq) 50 MG 24 hr tablet     Sig: Take 1 tablet by mouth Daily.     Dispense:  90 tablet     Refill:  0         Follow Up   Return in about 3 months (around 6/6/2024).  Patient was given instructions and counseling regarding her condition or for health maintenance advice. Please see specific information pulled into the AVS if appropriate.     TREATMENT PLAN/GOALS: Continue supportive psychotherapy efforts and medications as indicated. Treatment and medication options discussed during today's visit. Patient acknowledged and verbally consented to continue with current treatment plan and was educated on the importance of compliance with treatment and follow-up appointments.    MEDICATION ISSUES:  Discussed medication options and treatment plan of prescribed medication as well as the risks, benefits, and side effects including potential falls, possible impaired driving and metabolic adversities among others. Patient is agreeable to call the office with any worsening of symptoms or onset of side effects. Patient is agreeable to call 911 or go to the nearest ER should he/she begin having SI/HI.        [facility]  [address] [place] [address] [address] [id number] [phone]    March 6, 2024 10:57 EST

## 2024-06-25 DIAGNOSIS — F33.1 MAJOR DEPRESSIVE DISORDER, RECURRENT EPISODE, MODERATE: ICD-10-CM

## 2024-06-25 DIAGNOSIS — F43.10 POST TRAUMATIC STRESS DISORDER (PTSD): ICD-10-CM

## 2024-06-25 DIAGNOSIS — F41.1 GAD (GENERALIZED ANXIETY DISORDER): ICD-10-CM

## 2024-06-26 RX ORDER — DESVENLAFAXINE SUCCINATE 50 MG/1
50 TABLET, EXTENDED RELEASE ORAL DAILY
Qty: 90 TABLET | Refills: 0 | Status: SHIPPED | OUTPATIENT
Start: 2024-06-26

## 2024-06-26 NOTE — TELEPHONE ENCOUNTER
Rx Refill Note  Requested Prescriptions     Pending Prescriptions Disp Refills    desvenlafaxine (Pristiq) 50 MG 24 hr tablet 90 tablet 0     Sig: Take 1 tablet by mouth Daily.      Last office visit with prescribing clinician: 3/6/2024   Last telemedicine visit with prescribing clinician: Visit date not found   Next office visit with prescribing clinician: 8/8/2024                         Would you like a call back once the refill request has been completed: [] Yes [] No    If the office needs to give you a call back, can they leave a voicemail: [] Yes [] No    Kelly Garcia MA  06/26/24, 10:46 EDT

## 2024-08-08 ENCOUNTER — OFFICE VISIT (OUTPATIENT)
Dept: BEHAVIORAL HEALTH | Facility: CLINIC | Age: 24
End: 2024-08-08
Payer: COMMERCIAL

## 2024-08-08 VITALS
OXYGEN SATURATION: 99 % | HEIGHT: 60 IN | SYSTOLIC BLOOD PRESSURE: 104 MMHG | BODY MASS INDEX: 22.97 KG/M2 | HEART RATE: 94 BPM | DIASTOLIC BLOOD PRESSURE: 64 MMHG | WEIGHT: 117 LBS

## 2024-08-08 DIAGNOSIS — F41.1 GAD (GENERALIZED ANXIETY DISORDER): Primary | ICD-10-CM

## 2024-08-08 DIAGNOSIS — F33.1 MAJOR DEPRESSIVE DISORDER, RECURRENT EPISODE, MODERATE: ICD-10-CM

## 2024-08-08 DIAGNOSIS — F43.10 POST TRAUMATIC STRESS DISORDER (PTSD): ICD-10-CM

## 2024-08-08 PROCEDURE — 99214 OFFICE O/P EST MOD 30 MIN: CPT

## 2024-08-08 RX ORDER — DESVENLAFAXINE SUCCINATE 50 MG/1
50 TABLET, EXTENDED RELEASE ORAL DAILY
Qty: 90 TABLET | Refills: 0 | Status: SHIPPED | OUTPATIENT
Start: 2024-08-08

## 2024-08-08 NOTE — PROGRESS NOTES
Follow Up Office Visit    Patient Name: Beatriz Rai  : 2000   MRN: [id number]   Care Team: Patient Care Team:  Tasha Hauser APRN as PCP - General (Family Medicine)  Kelly Irizarry APRN as Nurse Practitioner (Behavioral Health)         Chief Complaint:    Chief Complaint   Patient presents with    Anxiety    Depression    PTSD    Med Management       History of Present Illness: Beatriz Rai is a 24 y.o. female who is here today for a medication management follow up. Patient reports that overall medication continues to be effective. She feels that her anxiety and mood have been managed well. She did not see the need to make any changes and did not have any medication concerns at today's visit. She denies SI/HI today.     The following portion of the patient's history were reviewed and updated appropriately: allergies, current and past medications, family history, medical history and social history. DEWAYNE reviewed and appropriate.   Subjective   Review of Systems:    Review of Systems   Respiratory: Negative.     Cardiovascular: Negative.  Negative for chest pain and palpitations.   Neurological: Negative.    Psychiatric/Behavioral: Negative.     All other systems reviewed and are negative.      Current Medications:   Current Outpatient Medications   Medication Sig Dispense Refill    desvenlafaxine (Pristiq) 50 MG 24 hr tablet Take 1 tablet by mouth Daily. 90 tablet 0     No current facility-administered medications for this visit.       Mental Status Exam:   Hygiene:   good  Cooperation:  Cooperative  Eye Contact:  Good  Psychomotor Behavior:  Appropriate  Affect:  Appropriate  Mood: normal  Speech:  Normal  Thought Process:  Goal directed and Linear  Thought Content:  Normal and Mood congruent  Suicidal:  None  Homicidal:  None  Hallucinations:  None  Delusion:  None  Memory:  Intact  Orientation:  Person, Place, Time, and Situation  Reliability:  good  Insight:   "Good  Judgement:  Good  Impulse Control:  Good  Physical/Medical Issues:  No      Objective   Vital Signs:   /64   Pulse 94   Ht 152.4 cm (60\")   Wt 53.1 kg (117 lb)   SpO2 99%   BMI 22.85 kg/m²       Assessment / Plan    Diagnoses and all orders for this visit:    1. HARJEET (generalized anxiety disorder) (Primary)  -     desvenlafaxine (Pristiq) 50 MG 24 hr tablet; Take 1 tablet by mouth Daily.  Dispense: 90 tablet; Refill: 0    2. Major depressive disorder, recurrent episode, moderate  -     desvenlafaxine (Pristiq) 50 MG 24 hr tablet; Take 1 tablet by mouth Daily.  Dispense: 90 tablet; Refill: 0    3. Post traumatic stress disorder (PTSD)  -     desvenlafaxine (Pristiq) 50 MG 24 hr tablet; Take 1 tablet by mouth Daily.  Dispense: 90 tablet; Refill: 0       Patient appears to be doing well on current medication. No issues reported and no indication for change. Will continue medication as ordered.     PHQ-9  PHQ-2/PHQ-9: Depression Screening  Little Interest or Pleasure in Doing Things: 0-->not at all  Feeling Down, Depressed or Hopeless: 0-->not at all  PHQ-2 Total Score: 0  PHQ-9: Brief Depression Severity Measure Score: 0      PHQ-9 Score:   PHQ-9 Total Score: 0      HARJEET-7  Over the last two weeks, how often have you been bothered by the following problems?  Feeling nervous, anxious or on edge: Not at all  Not being able to stop or control worrying: Not at all  Worrying too much about different things: Not at all  Trouble Relaxing: Not at all  Being so restless that it is hard to sit still: Not at all  Becoming easily annoyed or irritable: Not at all  Feeling afraid as if something awful might happen: Not at all  HARJEET 7 Total Score: 0  If you checked any problems, how difficult have these problems made it for you to do your work, take care of things at home, or get along with other people: Not difficult at all      ADHD  Screening for Adults With ADHD - (1-6)  1. How often do you have trouble wrapping up " the final details of a project, once the challenging parts have been done?: Never  2. How often do you have difficulty getting things in order when you have to do a task that requires organization?: Rarely  3. How often do you have problems remembering appointments or obligations : Rarely  4. When you have a task that requires a lot of thought, how often do you avoid or delay getting started ?: Rarely  5. How often do you fidget or squirm with your hands or feet when you have to sit down for a long time?: Sometimes  6. How often do you feel overly active and compelled to do things, like you were driven by a motor?: Never  7. How often do you make careless mistakes when you have to work on a boring or difficult project?: Never  8. How often do have difficulty keeping your attention when you are doing boring or repetitive work?: Never  9. How often do you have difficulty concentrating on what people say to you, even when they are speaking to you: Rarely  10.How often do you misplace or have difficulty finding things at home or at work?: Rarely  11.How often are you distracted by activity or noise around you?: Rarely  12.How often do you leave your seat in meetings or other situations in which you are expected to remain seated?: Never  13.How often do you feel restless or fidgety?: Rarely  14.How often do you have difficulty unwinding and relaxing when you have time to yourself?: Never  15.How often do you find yourself talking too much when you are in social situations?: Never  16.When you’re in a conversation, how often do you find yourself finishing the sentences of the people you are talking to, before they can finish them themselves?: Often  17.How often do you have difficulty waiting your turn in situations when turn taking is required?: Never  18.How often do you interrupt others when they are busy?: Sometimes    A psychological evaluation was conducted in order to assess past and current level of functioning.  Areas assessed included, but were not limited to: perception of social support, perception of ability to face and deal with challenges in life (positive functioning), anxiety symptoms, depressive symptoms, perspective on beliefs/belief system, coping skills for stress, intelligence level,  Therapeutic rapport was established. Interventions conducted today were geared towards incorporating medication management along with support for continued therapy. Education was also provided as to the med management with this provider and what to expect in subsequent sessions.      We discussed risks, benefits, goals and side effects of the above medication and the patient was agreeable with the plan. Patient was educated on the importance of compliance with treatment and follow-up appointments. Patient is aware to contact the [facility] with any worsening of symptoms. To call for questions or concerns and return early if necessary. Patent is agreeable to go to the Emergency Department or call 911 should they begin SI/HI.    MEDS ORDERED DURING VISIT:  New Medications Ordered This Visit   Medications    desvenlafaxine (Pristiq) 50 MG 24 hr tablet     Sig: Take 1 tablet by mouth Daily.     Dispense:  90 tablet     Refill:  0         Follow Up   Return in about 3 months (around 11/8/2024).  Patient was given instructions and counseling regarding her condition or for health maintenance advice. Please see specific information pulled into the AVS if appropriate.     TREATMENT PLAN/GOALS: Continue supportive psychotherapy efforts and medications as indicated. Treatment and medication options discussed during today's visit. Patient acknowledged and verbally consented to continue with current treatment plan and was educated on the importance of compliance with treatment and follow-up appointments.    MEDICATION ISSUES:  Discussed medication options and treatment plan of prescribed medication as well as the risks, benefits, and side  effects including potential falls, possible impaired driving and metabolic adversities among others. Patient is agreeable to call the office with any worsening of symptoms or onset of side effects. Patient is agreeable to call 911 or go to the nearest ER should he/she begin having SI/HI.        [facility]  [address] [id number] [phone]    August 12, 2024 09:04 EDT   18-Oct-2019 13:52

## 2024-11-07 ENCOUNTER — OFFICE VISIT (OUTPATIENT)
Dept: BEHAVIORAL HEALTH | Facility: CLINIC | Age: 24
End: 2024-11-07
Payer: COMMERCIAL

## 2024-11-07 VITALS
WEIGHT: 113 LBS | DIASTOLIC BLOOD PRESSURE: 60 MMHG | HEIGHT: 60 IN | BODY MASS INDEX: 22.19 KG/M2 | OXYGEN SATURATION: 99 % | SYSTOLIC BLOOD PRESSURE: 104 MMHG | HEART RATE: 97 BPM

## 2024-11-07 DIAGNOSIS — F43.10 POST TRAUMATIC STRESS DISORDER (PTSD): ICD-10-CM

## 2024-11-07 DIAGNOSIS — F41.1 GAD (GENERALIZED ANXIETY DISORDER): Primary | ICD-10-CM

## 2024-11-07 DIAGNOSIS — F33.1 MAJOR DEPRESSIVE DISORDER, RECURRENT EPISODE, MODERATE: ICD-10-CM

## 2024-11-07 PROCEDURE — 99214 OFFICE O/P EST MOD 30 MIN: CPT

## 2024-11-07 RX ORDER — DESVENLAFAXINE 50 MG/1
50 TABLET, FILM COATED, EXTENDED RELEASE ORAL DAILY
Qty: 90 TABLET | Refills: 1 | Status: SHIPPED | OUTPATIENT
Start: 2024-11-07

## 2024-11-07 NOTE — PROGRESS NOTES
Follow Up Office Visit    Patient Name: Beatriz Rai  : 2000   MRN: [id number]   Care Team: Patient Care Team:  Tasha Hauser APRN as PCP - General (Family Medicine)  Kelly Irizarry APRN as Nurse Practitioner (Behavioral Health)         Chief Complaint:    Chief Complaint   Patient presents with    Anxiety    Depression    PTSD    Med Management       History of Present Illness: Beatriz Rai is a 24 y.o. female who is here today for a medication management follow up. Patient reports that overall medication continues to be effective. She feels that her mood and anxiety have been managed well. She did not see the need to make any changes and did not have any medication concerns at today's visit. She denies SI/HI today.     The following portion of the patient's history were reviewed and updated appropriately: allergies, current and past medications, family history, medical history and social history. DEWAYNE reviewed and appropriate.   Subjective   Review of Systems:    Review of Systems   Respiratory: Negative.     Cardiovascular: Negative.  Negative for chest pain and palpitations.   Neurological: Negative.    Psychiatric/Behavioral: Negative.     All other systems reviewed and are negative.      Current Medications:   Current Outpatient Medications   Medication Sig Dispense Refill    desvenlafaxine (Pristiq) 50 MG 24 hr tablet Take 1 tablet by mouth Daily. 90 tablet 1     No current facility-administered medications for this visit.       Mental Status Exam:   Hygiene:   good  Cooperation:  Cooperative  Eye Contact:  Good  Psychomotor Behavior:  Appropriate  Affect:  Appropriate  Mood: normal  Speech:  Normal  Thought Process:  Goal directed and Linear  Thought Content:  Normal and Mood congruent  Suicidal:  None  Homicidal:  None  Hallucinations:  None  Delusion:  None  Memory:  Intact  Orientation:  Person, Place, Time, and Situation  Reliability:  good  Insight:   "Good  Judgement:  Good  Impulse Control:  Good  Physical/Medical Issues:  Yes see chart       Objective   Vital Signs:   /60   Pulse 97   Ht 152.4 cm (60\")   Wt 51.3 kg (113 lb)   SpO2 99%   BMI 22.07 kg/m²       Assessment / Plan    Diagnoses and all orders for this visit:    1. HARJEET (generalized anxiety disorder) (Primary)  -     desvenlafaxine (Pristiq) 50 MG 24 hr tablet; Take 1 tablet by mouth Daily.  Dispense: 90 tablet; Refill: 1    2. Major depressive disorder, recurrent episode, moderate  -     desvenlafaxine (Pristiq) 50 MG 24 hr tablet; Take 1 tablet by mouth Daily.  Dispense: 90 tablet; Refill: 1    3. Post traumatic stress disorder (PTSD)  -     desvenlafaxine (Pristiq) 50 MG 24 hr tablet; Take 1 tablet by mouth Daily.  Dispense: 90 tablet; Refill: 1     Patient appears to be doing well on current medication. No issues reported and no indication for change. Will continue medication as ordered.     PHQ-9 Depression Screening  Little interest or pleasure in doing things? Not at all   Feeling down, depressed, or hopeless? Not at all   Trouble falling or staying asleep, or sleeping too much? Almost all   Feeling tired or having little energy? Not at all   Poor appetite or overeating? Not at all   Feeling bad about yourself - or that you are a failure or have let yourself or your family down? Not at all   Trouble concentrating on things, such as reading the newspaper or watching television? Several days   Moving or speaking so slowly that other people could have noticed? Or the opposite - being so fidgety or restless that you have been moving around a lot more than usual? Not at all   Thoughts that you would be better off dead, or of hurting yourself in some way? Not at all   PHQ-9 Total Score 4   If you checked off any problems, how difficult have these problems made it for you to do your work, take care of things at home, or get along with other people? Somewhat difficult     PHQ-9 Score:   PHQ-9 " Total Score: 4    Depression Screening:  Patient screened positive for depression based on a PHQ-9 score of 4 on 11/7/2024. Follow-up recommendations include: Prescribed antidepressant medication treatment and Suicide Risk Assessment performed.        HARJEET-7  Over the last two weeks, how often have you been bothered by the following problems?  Feeling nervous, anxious or on edge: Not at all  Not being able to stop or control worrying: Not at all  Worrying too much about different things: Not at all  Trouble Relaxing: Not at all  Being so restless that it is hard to sit still: Not at all  Becoming easily annoyed or irritable: Not at all  Feeling afraid as if something awful might happen: Not at all  HARJEET 7 Total Score: 0  If you checked any problems, how difficult have these problems made it for you to do your work, take care of things at home, or get along with other people: Not difficult at all      ADHD  Screening for Adults With ADHD - (1-6)  1. How often do you have trouble wrapping up the final details of a project, once the challenging parts have been done?: Rarely  2. How often do you have difficulty getting things in order when you have to do a task that requires organization?: Rarely  3. How often do you have problems remembering appointments or obligations : Sometimes  4. When you have a task that requires a lot of thought, how often do you avoid or delay getting started ?: Sometimes  5. How often do you fidget or squirm with your hands or feet when you have to sit down for a long time?: Sometimes  6. How often do you feel overly active and compelled to do things, like you were driven by a motor?: Never  7. How often do you make careless mistakes when you have to work on a boring or difficult project?: Rarely  8. How often do have difficulty keeping your attention when you are doing boring or repetitive work?: Often  9. How often do you have difficulty concentrating on what people say to you, even when they  are speaking to you: Rarely  10.How often do you misplace or have difficulty finding things at home or at work?: Rarely  11.How often are you distracted by activity or noise around you?: Sometimes  12.How often do you leave your seat in meetings or other situations in which you are expected to remain seated?: Rarely  13.How often do you feel restless or fidgety?: Rarely  14.How often do you have difficulty unwinding and relaxing when you have time to yourself?: Never  15.How often do you find yourself talking too much when you are in social situations?: Never  16.When you’re in a conversation, how often do you find yourself finishing the sentences of the people you are talking to, before they can finish them themselves?: Often  17.How often do you have difficulty waiting your turn in situations when turn taking is required?: Never  18.How often do you interrupt others when they are busy?: Rarely    A psychological evaluation was conducted in order to assess past and current level of functioning. Areas assessed included, but were not limited to: perception of social support, perception of ability to face and deal with challenges in life (positive functioning), anxiety symptoms, depressive symptoms, perspective on beliefs/belief system, coping skills for stress, intelligence level,  Therapeutic rapport was established. Interventions conducted today were geared towards incorporating medication management along with support for continued therapy. Education was also provided as to the med management with this provider and what to expect in subsequent sessions.      We discussed risks, benefits, goals and side effects of the above medication and the patient was agreeable with the plan. Patient was educated on the importance of compliance with treatment and follow-up appointments. Patient is aware to contact the [facility] with any worsening of symptoms. To call for questions or concerns and return early if necessary.  Patent is agreeable to go to the Emergency Department or call 911 should they begin SI/HI.    MEDS ORDERED DURING VISIT:  New Medications Ordered This Visit   Medications    desvenlafaxine (Pristiq) 50 MG 24 hr tablet     Sig: Take 1 tablet by mouth Daily.     Dispense:  90 tablet     Refill:  1         Follow Up   Return in about 6 months (around 5/7/2025).  Patient was given instructions and counseling regarding her condition or for health maintenance advice. Please see specific information pulled into the AVS if appropriate.     TREATMENT PLAN/GOALS: Continue supportive psychotherapy efforts and medications as indicated. Treatment and medication options discussed during today's visit. Patient acknowledged and verbally consented to continue with current treatment plan and was educated on the importance of compliance with treatment and follow-up appointments.    MEDICATION ISSUES:  Discussed medication options and treatment plan of prescribed medication as well as the risks, benefits, and side effects including potential falls, possible impaired driving and metabolic adversities among others. Patient is agreeable to call the office with any worsening of symptoms or onset of side effects. Patient is agreeable to call 911 or go to the nearest ER should he/she begin having SI/HI.        [facility]  [address] [place] [address] [address] [id number] [phone]    November 7, 2024 10:43 EST

## 2025-05-07 ENCOUNTER — OFFICE VISIT (OUTPATIENT)
Dept: BEHAVIORAL HEALTH | Facility: CLINIC | Age: 25
End: 2025-05-07

## 2025-05-07 VITALS
HEIGHT: 60 IN | OXYGEN SATURATION: 99 % | DIASTOLIC BLOOD PRESSURE: 66 MMHG | SYSTOLIC BLOOD PRESSURE: 104 MMHG | HEART RATE: 90 BPM | WEIGHT: 109 LBS | BODY MASS INDEX: 21.4 KG/M2

## 2025-05-07 DIAGNOSIS — F41.1 GAD (GENERALIZED ANXIETY DISORDER): Primary | ICD-10-CM

## 2025-05-07 DIAGNOSIS — F33.1 MAJOR DEPRESSIVE DISORDER, RECURRENT EPISODE, MODERATE: ICD-10-CM

## 2025-05-07 DIAGNOSIS — F43.10 POST TRAUMATIC STRESS DISORDER (PTSD): ICD-10-CM

## 2025-05-07 PROCEDURE — 99214 OFFICE O/P EST MOD 30 MIN: CPT

## 2025-05-07 RX ORDER — DESVENLAFAXINE 50 MG/1
50 TABLET, FILM COATED, EXTENDED RELEASE ORAL DAILY
Qty: 90 TABLET | Refills: 1 | Status: SHIPPED | OUTPATIENT
Start: 2025-05-07

## 2025-05-07 NOTE — PROGRESS NOTES
Follow Up Office Visit    Patient Name: Beatriz Rai  : 2000   MRN: [id number]   Care Team: Patient Care Team:  Provider, No Known as PCP - Kelly Valadez APRN as Nurse Practitioner (Behavioral Health)         Chief Complaint:    Chief Complaint   Patient presents with    Anxiety    Depression    PTSD    Med Management       History of Present Illness: Beatriz Rai is a 25 y.o. female who is here today for a medication management follow up.  Patient reports that overall medication continues to be effective.  She feels that her anxiety and mood continue to be managed well. She did not see the need to make any changes and did not have any medication concerns at today's visit. She denies SI/HI today.     The following portion of the patient's history were reviewed and updated appropriately: allergies, current and past medications, family history, medical history and social history. DEWAYNE reviewed and appropriate.   Subjective   Review of Systems:    Review of Systems   Respiratory: Negative.     Cardiovascular: Negative.  Negative for chest pain and palpitations.   Neurological: Negative.    Psychiatric/Behavioral: Negative.     All other systems reviewed and are negative.      Current Medications:   Current Outpatient Medications   Medication Sig Dispense Refill    desvenlafaxine (Pristiq) 50 MG 24 hr tablet Take 1 tablet by mouth Daily. 90 tablet 1    Magnesium 100 MG capsule Magnesium      Omega-3 Fatty Acids (Fish Oil) 300 MG capsule Fish Oil       No current facility-administered medications for this visit.       Mental Status Exam:   Hygiene:   good  Cooperation:  Cooperative  Eye Contact:  Good  Psychomotor Behavior:  Appropriate  Affect:  Appropriate  Mood: normal  Speech:  Normal  Thought Process:  Goal directed and Linear  Thought Content:  Normal and Mood congruent  Suicidal:  None  Homicidal:  None  Hallucinations:  None  Delusion:  None  Memory:   "Intact  Orientation:  Person, Place, Time, and Situation  Reliability:  good  Insight:  Good  Judgement:  Good  Impulse Control:  Good  Physical/Medical Issues:  Yes see chart       Objective   Vital Signs:   /66   Pulse 90   Ht 152.4 cm (60\")   Wt 49.4 kg (109 lb)   SpO2 99%   BMI 21.29 kg/m²         Lab Results:   Lab Results   Component Value Date    WBC 6.71 08/09/2021    HGB 15.4 08/09/2021    HCT 46.8 (H) 08/09/2021    MCV 90.3 08/09/2021     08/09/2021        Lab Results   Component Value Date    GLUCOSE 85 08/09/2021    BUN 8 08/09/2021    CREATININE 0.78 08/09/2021     08/09/2021    K 4.0 08/09/2021     08/09/2021    CALCIUM 9.7 08/09/2021    PROTEINTOT 7.2 08/09/2021    ALBUMIN 4.60 08/09/2021    ALT 11 08/09/2021    AST 17 08/09/2021    ALKPHOS 75 08/09/2021    BILITOT 0.2 08/09/2021    GLOB 2.6 08/09/2021    AGRATIO 1.8 08/09/2021    BCR 10.3 08/09/2021    ANIONGAP 12.9 12/11/2018      Lab Results   Component Value Date    TSH 2.940 08/09/2021    B3YTSOM 6.4 08/09/2021            Assessment / Plan    Diagnoses and all orders for this visit:    1. HARJEET (generalized anxiety disorder) (Primary)  -     desvenlafaxine (Pristiq) 50 MG 24 hr tablet; Take 1 tablet by mouth Daily.  Dispense: 90 tablet; Refill: 1    2. Major depressive disorder, recurrent episode, moderate  -     desvenlafaxine (Pristiq) 50 MG 24 hr tablet; Take 1 tablet by mouth Daily.  Dispense: 90 tablet; Refill: 1    3. Post traumatic stress disorder (PTSD)  -     desvenlafaxine (Pristiq) 50 MG 24 hr tablet; Take 1 tablet by mouth Daily.  Dispense: 90 tablet; Refill: 1       Patient appears to be doing well on current medication. No issues reported and no indication for change. Will continue medication as ordered.       PHQ-9 Depression Screening  Little interest or pleasure in doing things? Not at all   Feeling down, depressed, or hopeless? Not at all   Trouble falling or staying asleep, or sleeping too much? Not " at all   Feeling tired or having little energy? Not at all   Poor appetite or overeating? Not at all   Feeling bad about yourself - or that you are a failure or have let yourself or your family down? Not at all   Trouble concentrating on things, such as reading the newspaper or watching television? Not at all   Moving or speaking so slowly that other people could have noticed? Or the opposite - being so fidgety or restless that you have been moving around a lot more than usual? Not at all   Thoughts that you would be better off dead, or of hurting yourself in some way? Not at all   PHQ-9 Total Score 0   If you checked off any problems, how difficult have these problems made it for you to do your work, take care of things at home, or get along with other people? Not difficult at all     PHQ-9 Score:   PHQ-9 Total Score: 0    Depression Screening:  Patient screened positive for depression based on a PHQ-9 score of 0 on 5/7/2025. Follow-up recommendations include: Prescribed antidepressant medication treatment, Suicide Risk Assessment performed, and Continue with medication management.        HARJEET-7  Over the last two weeks, how often have you been bothered by the following problems?  Feeling nervous, anxious or on edge: Not at all  Not being able to stop or control worrying: Not at all  Worrying too much about different things: Not at all  Trouble Relaxing: Not at all  Being so restless that it is hard to sit still: Not at all  Becoming easily annoyed or irritable: Not at all  Feeling afraid as if something awful might happen: Not at all  HARJEET 7 Total Score: 0  If you checked any problems, how difficult have these problems made it for you to do your work, take care of things at home, or get along with other people: Not difficult at all      ADHD  Screening for Adults With ADHD - (1-6)  1. How often do you have trouble wrapping up the final details of a project, once the challenging parts have been done?: Rarely  2. How  often do you have difficulty getting things in order when you have to do a task that requires organization?: Rarely  3. How often do you have problems remembering appointments or obligations : Rarely  4. When you have a task that requires a lot of thought, how often do you avoid or delay getting started ?: Rarely  5. How often do you fidget or squirm with your hands or feet when you have to sit down for a long time?: Rarely  6. How often do you feel overly active and compelled to do things, like you were driven by a motor?: Rarely  7. How often do you make careless mistakes when you have to work on a boring or difficult project?: Rarely  8. How often do have difficulty keeping your attention when you are doing boring or repetitive work?: Rarely  9. How often do you have difficulty concentrating on what people say to you, even when they are speaking to you: Rarely  10.How often do you misplace or have difficulty finding things at home or at work?: Rarely  11.How often are you distracted by activity or noise around you?: Rarely  12.How often do you leave your seat in meetings or other situations in which you are expected to remain seated?: Rarely  13.How often do you feel restless or fidgety?: Rarely  14.How often do you have difficulty unwinding and relaxing when you have time to yourself?: Rarely  15.How often do you find yourself talking too much when you are in social situations?: Rarely  16.When you’re in a conversation, how often do you find yourself finishing the sentences of the people you are talking to, before they can finish them themselves?: Rarely  17.How often do you have difficulty waiting your turn in situations when turn taking is required?: Rarely  18.How often do you interrupt others when they are busy?: Rarely    A psychological evaluation was conducted in order to assess past and current level of functioning. Areas assessed included, but were not limited to: perception of social support,  perception of ability to face and deal with challenges in life (positive functioning), anxiety symptoms, depressive symptoms, perspective on beliefs/belief system, coping skills for stress, intelligence level,  Therapeutic rapport was established. Interventions conducted today were geared towards incorporating medication management along with support for continued therapy. Education was also provided as to the med management with this provider and what to expect in subsequent sessions.      We discussed risks, benefits, goals and side effects of the above medication and the patient was agreeable with the plan. Patient was educated on the importance of compliance with treatment and follow-up appointments. Patient is aware to contact the [facility] with any worsening of symptoms. To call for questions or concerns and return early if necessary. Patent is agreeable to go to the Emergency Department or call 911 should they begin SI/HI.    MEDS ORDERED DURING VISIT:  New Medications Ordered This Visit   Medications    desvenlafaxine (Pristiq) 50 MG 24 hr tablet     Sig: Take 1 tablet by mouth Daily.     Dispense:  90 tablet     Refill:  1         Follow Up   Return in about 6 months (around 11/7/2025).  Patient was given instructions and counseling regarding her condition or for health maintenance advice. Please see specific information pulled into the AVS if appropriate.     TREATMENT PLAN/GOALS: Continue supportive psychotherapy efforts and medications as indicated. Treatment and medication options discussed during today's visit. Patient acknowledged and verbally consented to continue with current treatment plan and was educated on the importance of compliance with treatment and follow-up appointments.    MEDICATION ISSUES:  Discussed medication options and treatment plan of prescribed medication as well as the risks, benefits, and side effects including potential falls, possible impaired driving and metabolic  adversities among others. Patient is agreeable to call the office with any worsening of symptoms or onset of side effects. Patient is agreeable to call 911 or go to the nearest ER should he/she begin having SI/HI.        [facility]  [address] [place] [id number]  [phone]    May 7, 2025 14:33 EDT

## (undated) DEVICE — ADHS LIQ MASTISOL 2/3ML

## (undated) DEVICE — GOWN,SIRUS,NON REINFRCD,LARGE,SET IN SL: Brand: MEDLINE

## (undated) DEVICE — BNDG ELAS MATRX V/CLS 4IN 5YD LF

## (undated) DEVICE — SPNG GZ WOVN 4X4IN 12PLY 10/BX STRL

## (undated) DEVICE — DRSNG SURESITE WNDW 4X4.5

## (undated) DEVICE — CLAVICLE STRAP: Brand: DEROYAL

## (undated) DEVICE — GLV SURG TRIUMPH ORTHO W/ALOE PF LTX 8 STRL

## (undated) DEVICE — Device

## (undated) DEVICE — OCCLUSIVE GAUZE STRIP,3% BISMUTH TRIBROMOPHENATE IN PETROLATUM BLEND: Brand: XEROFORM

## (undated) DEVICE — GLV SURG SENSICARE W/ALOE PF LF 8 STRL

## (undated) DEVICE — STRIP,CLOSURE,WOUND,MEDI-STRIP,1/2X4: Brand: MEDLINE

## (undated) DEVICE — DRSNG WND BORDR/ADHS NONADHR/GZ LF 4X4IN STRL

## (undated) DEVICE — SUT VIC 2/0 CT1 27IN J259H

## (undated) DEVICE — SUT VIC 0 CT 36IN J958H

## (undated) DEVICE — PK EXTRM LOWR 20

## (undated) DEVICE — AMD ANTIMICROBIAL GAUZE SPONGES,12 PLY USP TYPE VII, 0.2% POLYHEXAMETHYLENE BIGUANIDE HCI (PHMB): Brand: CURITY